# Patient Record
Sex: MALE | Race: BLACK OR AFRICAN AMERICAN | NOT HISPANIC OR LATINO | Employment: FULL TIME | URBAN - METROPOLITAN AREA
[De-identification: names, ages, dates, MRNs, and addresses within clinical notes are randomized per-mention and may not be internally consistent; named-entity substitution may affect disease eponyms.]

---

## 2017-05-24 ENCOUNTER — HISTORICAL (OUTPATIENT)
Dept: ADMINISTRATIVE | Facility: HOSPITAL | Age: 57
End: 2017-05-24

## 2017-05-24 LAB
ABS NEUT (OLG): 4.96 X10(3)/MCL (ref 2.1–9.2)
ALBUMIN SERPL-MCNC: 3.7 GM/DL (ref 3.4–5)
ALBUMIN/GLOB SERPL: 1.1 RATIO (ref 1.1–2)
ALP SERPL-CCNC: 83 UNIT/L (ref 50–136)
ALT SERPL-CCNC: 28 UNIT/L (ref 12–78)
AST SERPL-CCNC: 15 UNIT/L (ref 15–37)
BASOPHILS # BLD AUTO: 0 X10(3)/MCL (ref 0–0.2)
BASOPHILS NFR BLD AUTO: 0 %
BILIRUB SERPL-MCNC: 0.7 MG/DL (ref 0.2–1)
BILIRUBIN DIRECT+TOT PNL SERPL-MCNC: 0.1 MG/DL (ref 0–0.5)
BILIRUBIN DIRECT+TOT PNL SERPL-MCNC: 0.6 MG/DL (ref 0–0.8)
BUN SERPL-MCNC: 13 MG/DL (ref 7–18)
CALCIUM SERPL-MCNC: 8.9 MG/DL (ref 8.5–10.1)
CHLORIDE SERPL-SCNC: 108 MMOL/L (ref 98–107)
CHOLEST SERPL-MCNC: 211 MG/DL (ref 0–200)
CHOLEST/HDLC SERPL: 4.7 {RATIO} (ref 0–5)
CO2 SERPL-SCNC: 25 MMOL/L (ref 21–32)
CREAT SERPL-MCNC: 1.33 MG/DL (ref 0.7–1.3)
DEPRECATED CALCIDIOL+CALCIFEROL SERPL-MC: 44.76 NG/ML (ref 30–80)
EOSINOPHIL # BLD AUTO: 0.1 X10(3)/MCL (ref 0–0.9)
EOSINOPHIL NFR BLD AUTO: 1 %
ERYTHROCYTE [DISTWIDTH] IN BLOOD BY AUTOMATED COUNT: 13 % (ref 11.5–17)
ERYTHROCYTE [SEDIMENTATION RATE] IN BLOOD: 8 MM/HR (ref 0–15)
GLOBULIN SER-MCNC: 3.4 GM/DL (ref 2.4–3.5)
GLUCOSE SERPL-MCNC: 97 MG/DL (ref 74–106)
HCT VFR BLD AUTO: 40.8 % (ref 42–52)
HDLC SERPL-MCNC: 45 MG/DL (ref 35–60)
HGB BLD-MCNC: 13.7 GM/DL (ref 14–18)
LDLC SERPL CALC-MCNC: 95 MG/DL (ref 0–129)
LYMPHOCYTES # BLD AUTO: 1.4 X10(3)/MCL (ref 0.6–4.6)
LYMPHOCYTES NFR BLD AUTO: 20 %
MCH RBC QN AUTO: 26.9 PG (ref 27–31)
MCHC RBC AUTO-ENTMCNC: 33.6 GM/DL (ref 33–36)
MCV RBC AUTO: 80.2 FL (ref 80–94)
MONOCYTES # BLD AUTO: 0.6 X10(3)/MCL (ref 0.1–1.3)
MONOCYTES NFR BLD AUTO: 8 %
NEUTROPHILS # BLD AUTO: 4.96 X10(3)/MCL (ref 2.1–9.2)
NEUTROPHILS NFR BLD AUTO: 71 %
PLATELET # BLD AUTO: 243 X10(3)/MCL (ref 130–400)
PMV BLD AUTO: 9.4 FL (ref 9.4–12.4)
POTASSIUM SERPL-SCNC: 3.4 MMOL/L (ref 3.5–5.1)
PROT SERPL-MCNC: 7.1 GM/DL (ref 6.4–8.2)
RBC # BLD AUTO: 5.09 X10(6)/MCL (ref 4.7–6.1)
SODIUM SERPL-SCNC: 145 MMOL/L (ref 136–145)
TRIGL SERPL-MCNC: 357 MG/DL (ref 30–150)
VIT B12 SERPL-MCNC: 218 PG/ML (ref 193–986)
VLDLC SERPL CALC-MCNC: 71 MG/DL
WBC # SPEC AUTO: 7 X10(3)/MCL (ref 4.5–11.5)

## 2018-02-08 ENCOUNTER — HISTORICAL (OUTPATIENT)
Dept: RADIOLOGY | Facility: HOSPITAL | Age: 58
End: 2018-02-08

## 2018-03-01 ENCOUNTER — HISTORICAL (OUTPATIENT)
Dept: RADIOLOGY | Facility: HOSPITAL | Age: 58
End: 2018-03-01

## 2018-03-01 LAB — POC CREATININE: 1.5 MG/DL (ref 0.6–1.3)

## 2018-07-11 ENCOUNTER — HISTORICAL (OUTPATIENT)
Dept: ADMINISTRATIVE | Facility: HOSPITAL | Age: 58
End: 2018-07-11

## 2018-07-11 LAB
ABS NEUT (OLG): 1.9 X10(3)/MCL (ref 2.1–9.2)
ALBUMIN SERPL-MCNC: 3.4 GM/DL (ref 3.4–5)
ALBUMIN SERPL-MCNC: 3.4 GM/DL (ref 3.4–5)
ALBUMIN/GLOB SERPL: 1 {RATIO}
ALP SERPL-CCNC: 75 UNIT/L (ref 50–136)
ALP SERPL-CCNC: 75 UNIT/L (ref 50–136)
ALT SERPL-CCNC: 24 UNIT/L (ref 12–78)
ALT SERPL-CCNC: 24 UNIT/L (ref 12–78)
AST SERPL-CCNC: 15 UNIT/L (ref 15–37)
AST SERPL-CCNC: 17 UNIT/L (ref 15–37)
BASOPHILS # BLD AUTO: 0 X10(3)/MCL (ref 0–0.2)
BASOPHILS NFR BLD AUTO: 1 %
BILIRUB SERPL-MCNC: 0.6 MG/DL (ref 0.2–1)
BILIRUB SERPL-MCNC: 1.2 MG/DL (ref 0.2–1)
BILIRUBIN DIRECT+TOT PNL SERPL-MCNC: 0.1 MG/DL (ref 0–0.2)
BILIRUBIN DIRECT+TOT PNL SERPL-MCNC: 0.1 MG/DL (ref 0–0.5)
BILIRUBIN DIRECT+TOT PNL SERPL-MCNC: 0.5 MG/DL (ref 0–0.8)
BILIRUBIN DIRECT+TOT PNL SERPL-MCNC: 1.1 MG/DL (ref 0–0.8)
BUN SERPL-MCNC: 16 MG/DL (ref 7–18)
CALCIUM SERPL-MCNC: 8 MG/DL (ref 8.5–10.1)
CHLORIDE SERPL-SCNC: 108 MMOL/L (ref 98–107)
CHOLEST SERPL-MCNC: 188 MG/DL (ref 0–200)
CHOLEST/HDLC SERPL: 5.5 {RATIO} (ref 0–5)
CO2 SERPL-SCNC: 28 MMOL/L (ref 21–32)
CREAT SERPL-MCNC: 1.43 MG/DL (ref 0.7–1.3)
DEPRECATED CALCIDIOL+CALCIFEROL SERPL-MC: 38 NG/ML (ref 30–80)
EOSINOPHIL # BLD AUTO: 0.1 X10(3)/MCL (ref 0–0.9)
EOSINOPHIL NFR BLD AUTO: 2 %
ERYTHROCYTE [DISTWIDTH] IN BLOOD BY AUTOMATED COUNT: 13.3 % (ref 11.5–17)
ERYTHROCYTE [SEDIMENTATION RATE] IN BLOOD: 8 MM/HR (ref 0–15)
GLOBULIN SER-MCNC: 3.3 GM/DL (ref 2.4–3.5)
GLUCOSE SERPL-MCNC: 91 MG/DL (ref 74–106)
HCT VFR BLD AUTO: 36.8 % (ref 42–52)
HDLC SERPL-MCNC: 34 MG/DL (ref 35–60)
HGB BLD-MCNC: 12.3 GM/DL (ref 14–18)
LDLC SERPL CALC-MCNC: 94 MG/DL (ref 0–129)
LIVER PROFILE INTERP: ABNORMAL
LYMPHOCYTES # BLD AUTO: 2.2 X10(3)/MCL (ref 0.6–4.6)
LYMPHOCYTES NFR BLD AUTO: 45 %
MCH RBC QN AUTO: 27.2 PG (ref 27–31)
MCHC RBC AUTO-ENTMCNC: 33.4 GM/DL (ref 33–36)
MCV RBC AUTO: 81.4 FL (ref 80–94)
MONOCYTES # BLD AUTO: 0.6 X10(3)/MCL (ref 0.1–1.3)
MONOCYTES NFR BLD AUTO: 12 %
NEUTROPHILS # BLD AUTO: 1.9 X10(3)/MCL (ref 2.1–9.2)
NEUTROPHILS NFR BLD AUTO: 39 %
PLATELET # BLD AUTO: 210 X10(3)/MCL (ref 130–400)
PMV BLD AUTO: 9.7 FL (ref 9.4–12.4)
POTASSIUM SERPL-SCNC: 3.3 MMOL/L (ref 3.5–5.1)
PROT SERPL-MCNC: 6.7 GM/DL (ref 6.4–8.2)
PROT SERPL-MCNC: 6.7 GM/DL (ref 6.4–8.2)
RBC # BLD AUTO: 4.52 X10(6)/MCL (ref 4.7–6.1)
SODIUM SERPL-SCNC: 144 MMOL/L (ref 136–145)
TRIGL SERPL-MCNC: 302 MG/DL (ref 30–150)
VIT B12 SERPL-MCNC: 166 PG/ML (ref 193–986)
VLDLC SERPL CALC-MCNC: 60 MG/DL
WBC # SPEC AUTO: 4.9 X10(3)/MCL (ref 4.5–11.5)

## 2018-07-20 ENCOUNTER — HISTORICAL (OUTPATIENT)
Dept: RADIOLOGY | Facility: HOSPITAL | Age: 58
End: 2018-07-20

## 2018-11-24 LAB
INFLUENZA A ANTIGEN, POC: NEGATIVE
INFLUENZA B ANTIGEN, POC: NEGATIVE

## 2019-03-04 ENCOUNTER — HISTORICAL (OUTPATIENT)
Dept: LAB | Facility: HOSPITAL | Age: 59
End: 2019-03-04

## 2019-03-04 LAB
ABS NEUT (OLG): 2.66 X10(3)/MCL (ref 2.1–9.2)
ALBUMIN SERPL-MCNC: 3.5 GM/DL (ref 3.4–5)
ALBUMIN/GLOB SERPL: 1.1 RATIO (ref 1.1–2)
ALP SERPL-CCNC: 72 UNIT/L (ref 50–136)
ALT SERPL-CCNC: 27 UNIT/L (ref 12–78)
AST SERPL-CCNC: 11 UNIT/L (ref 15–37)
BASOPHILS # BLD AUTO: 0 X10(3)/MCL (ref 0–0.2)
BASOPHILS NFR BLD AUTO: 1 %
BILIRUB SERPL-MCNC: 0.5 MG/DL (ref 0.2–1)
BILIRUBIN DIRECT+TOT PNL SERPL-MCNC: 0.1 MG/DL (ref 0–0.5)
BILIRUBIN DIRECT+TOT PNL SERPL-MCNC: 0.4 MG/DL (ref 0–0.8)
BUN SERPL-MCNC: 12 MG/DL (ref 7–18)
CALCIUM SERPL-MCNC: 8.4 MG/DL (ref 8.5–10.1)
CHLORIDE SERPL-SCNC: 107 MMOL/L (ref 98–107)
CHOLEST SERPL-MCNC: 211 MG/DL (ref 0–200)
CHOLEST/HDLC SERPL: 3.8 {RATIO} (ref 0–5)
CO2 SERPL-SCNC: 30 MMOL/L (ref 21–32)
CREAT SERPL-MCNC: 1.26 MG/DL (ref 0.7–1.3)
DEPRECATED CALCIDIOL+CALCIFEROL SERPL-MC: 85.25 NG/ML (ref 30–80)
EOSINOPHIL # BLD AUTO: 0.1 X10(3)/MCL (ref 0–0.9)
EOSINOPHIL NFR BLD AUTO: 2 %
ERYTHROCYTE [DISTWIDTH] IN BLOOD BY AUTOMATED COUNT: 13.6 % (ref 11.5–17)
ERYTHROCYTE [SEDIMENTATION RATE] IN BLOOD: 18 MM/HR (ref 0–15)
GLOBULIN SER-MCNC: 3.3 GM/DL (ref 2.4–3.5)
GLUCOSE SERPL-MCNC: 102 MG/DL (ref 74–106)
HCT VFR BLD AUTO: 41.7 % (ref 42–52)
HDLC SERPL-MCNC: 55 MG/DL (ref 35–60)
HGB BLD-MCNC: 13.5 GM/DL (ref 14–18)
LDLC SERPL CALC-MCNC: 124 MG/DL (ref 0–129)
LYMPHOCYTES # BLD AUTO: 1.8 X10(3)/MCL (ref 0.6–4.6)
LYMPHOCYTES NFR BLD AUTO: 35 %
MCH RBC QN AUTO: 26.3 PG (ref 27–31)
MCHC RBC AUTO-ENTMCNC: 32.4 GM/DL (ref 33–36)
MCV RBC AUTO: 81.3 FL (ref 80–94)
MONOCYTES # BLD AUTO: 0.4 X10(3)/MCL (ref 0.1–1.3)
MONOCYTES NFR BLD AUTO: 9 %
NEUTROPHILS # BLD AUTO: 2.66 X10(3)/MCL (ref 2.1–9.2)
NEUTROPHILS NFR BLD AUTO: 52 %
PLATELET # BLD AUTO: 238 X10(3)/MCL (ref 130–400)
PMV BLD AUTO: 9.2 FL (ref 9.4–12.4)
POTASSIUM SERPL-SCNC: 3.8 MMOL/L (ref 3.5–5.1)
PROT SERPL-MCNC: 6.8 GM/DL (ref 6.4–8.2)
RBC # BLD AUTO: 5.13 X10(6)/MCL (ref 4.7–6.1)
SODIUM SERPL-SCNC: 143 MMOL/L (ref 136–145)
TRIGL SERPL-MCNC: 161 MG/DL (ref 30–150)
VIT B12 SERPL-MCNC: 767 PG/ML (ref 193–986)
VLDLC SERPL CALC-MCNC: 32 MG/DL
WBC # SPEC AUTO: 5.1 X10(3)/MCL (ref 4.5–11.5)

## 2019-05-15 LAB — RAPID GROUP A STREP (OHS): NEGATIVE

## 2019-05-29 ENCOUNTER — HISTORICAL (OUTPATIENT)
Dept: HEPATOLOGY | Facility: HOSPITAL | Age: 59
End: 2019-05-29

## 2019-05-29 LAB — POC CREATININE: 1.3 MG/DL (ref 0.6–1.3)

## 2019-06-16 ENCOUNTER — HISTORICAL (OUTPATIENT)
Dept: ADMINISTRATIVE | Facility: HOSPITAL | Age: 59
End: 2019-06-16

## 2019-06-26 ENCOUNTER — HISTORICAL (OUTPATIENT)
Dept: LAB | Facility: HOSPITAL | Age: 59
End: 2019-06-26

## 2019-06-26 LAB
ABS NEUT (OLG): 2.28 X10(3)/MCL (ref 2.1–9.2)
ALBUMIN SERPL-MCNC: 3.8 GM/DL (ref 3.4–5)
ALBUMIN/GLOB SERPL: 1.2 {RATIO}
ALP SERPL-CCNC: 81 UNIT/L (ref 50–136)
ALT SERPL-CCNC: 46 UNIT/L (ref 12–78)
AST SERPL-CCNC: 19 UNIT/L (ref 15–37)
BASOPHILS # BLD AUTO: 0 X10(3)/MCL (ref 0–0.2)
BASOPHILS NFR BLD AUTO: 1 %
BILIRUB SERPL-MCNC: 0.5 MG/DL (ref 0.2–1)
BILIRUBIN DIRECT+TOT PNL SERPL-MCNC: 0.1 MG/DL (ref 0–0.2)
BILIRUBIN DIRECT+TOT PNL SERPL-MCNC: 0.4 MG/DL (ref 0–0.8)
BUN SERPL-MCNC: 12 MG/DL (ref 7–18)
CALCIUM SERPL-MCNC: 8.5 MG/DL (ref 8.5–10.1)
CHLORIDE SERPL-SCNC: 106 MMOL/L (ref 98–107)
CO2 SERPL-SCNC: 27 MMOL/L (ref 21–32)
CREAT SERPL-MCNC: 1.31 MG/DL (ref 0.7–1.3)
CRP SERPL HS-MCNC: 1.09 MG/L (ref 0–3)
DEPRECATED CALCIDIOL+CALCIFEROL SERPL-MC: 97.8 NG/ML (ref 30–80)
EOSINOPHIL # BLD AUTO: 0 X10(3)/MCL (ref 0–0.9)
EOSINOPHIL NFR BLD AUTO: 1 %
ERYTHROCYTE [DISTWIDTH] IN BLOOD BY AUTOMATED COUNT: 12.9 % (ref 11.5–17)
ERYTHROCYTE [SEDIMENTATION RATE] IN BLOOD: 13 MM/HR (ref 0–15)
GLOBULIN SER-MCNC: 3.2 GM/DL (ref 2.4–3.5)
GLUCOSE SERPL-MCNC: 91 MG/DL (ref 74–106)
HCT VFR BLD AUTO: 39.2 % (ref 42–52)
HGB BLD-MCNC: 12.9 GM/DL (ref 14–18)
LYMPHOCYTES # BLD AUTO: 1.9 X10(3)/MCL (ref 0.6–4.6)
LYMPHOCYTES NFR BLD AUTO: 40 %
MCH RBC QN AUTO: 26.2 PG (ref 27–31)
MCHC RBC AUTO-ENTMCNC: 32.9 GM/DL (ref 33–36)
MCV RBC AUTO: 79.5 FL (ref 80–94)
MONOCYTES # BLD AUTO: 0.5 X10(3)/MCL (ref 0.1–1.3)
MONOCYTES NFR BLD AUTO: 10 %
NEUTROPHILS # BLD AUTO: 2.28 X10(3)/MCL (ref 2.1–9.2)
NEUTROPHILS NFR BLD AUTO: 48 %
PLATELET # BLD AUTO: 283 X10(3)/MCL (ref 130–400)
PMV BLD AUTO: 9.4 FL (ref 9.4–12.4)
POTASSIUM SERPL-SCNC: 3.6 MMOL/L (ref 3.5–5.1)
PROT SERPL-MCNC: 7 GM/DL (ref 6.4–8.2)
RBC # BLD AUTO: 4.93 X10(6)/MCL (ref 4.7–6.1)
SODIUM SERPL-SCNC: 141 MMOL/L (ref 136–145)
VIT B12 SERPL-MCNC: 1640 PG/ML (ref 193–986)
WBC # SPEC AUTO: 4.8 X10(3)/MCL (ref 4.5–11.5)

## 2019-07-30 ENCOUNTER — HISTORICAL (OUTPATIENT)
Dept: ADMINISTRATIVE | Facility: HOSPITAL | Age: 59
End: 2019-07-30

## 2019-09-27 ENCOUNTER — HISTORICAL (OUTPATIENT)
Dept: ADMINISTRATIVE | Facility: HOSPITAL | Age: 59
End: 2019-09-27

## 2019-11-07 ENCOUNTER — HISTORICAL (OUTPATIENT)
Dept: ADMINISTRATIVE | Facility: HOSPITAL | Age: 59
End: 2019-11-07

## 2019-11-07 LAB
ABS NEUT (OLG): 2.27 X10(3)/MCL (ref 2.1–9.2)
ALBUMIN SERPL-MCNC: 4.1 GM/DL (ref 3.4–5)
ALBUMIN/GLOB SERPL: 1.4 RATIO (ref 1.1–2)
ALP SERPL-CCNC: 91 UNIT/L (ref 50–136)
ALT SERPL-CCNC: 54 UNIT/L (ref 12–78)
AST SERPL-CCNC: 28 UNIT/L (ref 15–37)
BASOPHILS # BLD AUTO: 0 X10(3)/MCL (ref 0–0.2)
BASOPHILS NFR BLD AUTO: 1 %
BILIRUB SERPL-MCNC: 0.9 MG/DL (ref 0.2–1)
BILIRUBIN DIRECT+TOT PNL SERPL-MCNC: 0.2 MG/DL (ref 0–0.5)
BILIRUBIN DIRECT+TOT PNL SERPL-MCNC: 0.7 MG/DL (ref 0–0.8)
BNP BLD-MCNC: 24 PG/ML (ref 0–125)
BUN SERPL-MCNC: 11 MG/DL (ref 7–18)
CALCIUM SERPL-MCNC: 8.8 MG/DL (ref 8.5–10.1)
CHLORIDE SERPL-SCNC: 105 MMOL/L (ref 98–107)
CHOLEST SERPL-MCNC: 124 MG/DL (ref 0–200)
CHOLEST/HDLC SERPL: 3.2 {RATIO} (ref 0–5)
CO2 SERPL-SCNC: 30 MMOL/L (ref 21–32)
CREAT SERPL-MCNC: 1.37 MG/DL (ref 0.7–1.3)
CRP SERPL HS-MCNC: 0.67 MG/L (ref 0–3)
DEPRECATED CALCIDIOL+CALCIFEROL SERPL-MC: 119.51 NG/ML (ref 30–80)
EOSINOPHIL # BLD AUTO: 0.1 X10(3)/MCL (ref 0–0.9)
EOSINOPHIL NFR BLD AUTO: 1 %
ERYTHROCYTE [DISTWIDTH] IN BLOOD BY AUTOMATED COUNT: 12.9 % (ref 11.5–17)
ERYTHROCYTE [SEDIMENTATION RATE] IN BLOOD: 8 MM/HR (ref 0–15)
GLOBULIN SER-MCNC: 3 GM/DL (ref 2.4–3.5)
GLUCOSE SERPL-MCNC: 98 MG/DL (ref 74–106)
HCT VFR BLD AUTO: 41.7 % (ref 42–52)
HDLC SERPL-MCNC: 39 MG/DL (ref 35–60)
HGB BLD-MCNC: 13.7 GM/DL (ref 14–18)
LDLC SERPL CALC-MCNC: 58 MG/DL (ref 0–129)
LYMPHOCYTES # BLD AUTO: 1.9 X10(3)/MCL (ref 0.6–4.6)
LYMPHOCYTES NFR BLD AUTO: 38 %
MCH RBC QN AUTO: 26.7 PG (ref 27–31)
MCHC RBC AUTO-ENTMCNC: 32.9 GM/DL (ref 33–36)
MCV RBC AUTO: 81.1 FL (ref 80–94)
MONOCYTES # BLD AUTO: 0.6 X10(3)/MCL (ref 0.1–1.3)
MONOCYTES NFR BLD AUTO: 12 %
NEUTROPHILS # BLD AUTO: 2.27 X10(3)/MCL (ref 2.1–9.2)
NEUTROPHILS NFR BLD AUTO: 47 %
PLATELET # BLD AUTO: 288 X10(3)/MCL (ref 130–400)
PMV BLD AUTO: 9.1 FL (ref 9.4–12.4)
POTASSIUM SERPL-SCNC: 3.6 MMOL/L (ref 3.5–5.1)
PROT SERPL-MCNC: 7.1 GM/DL (ref 6.4–8.2)
RBC # BLD AUTO: 5.14 X10(6)/MCL (ref 4.7–6.1)
SODIUM SERPL-SCNC: 141 MMOL/L (ref 136–145)
TRIGL SERPL-MCNC: 137 MG/DL (ref 30–150)
VIT B12 SERPL-MCNC: 896 PG/ML (ref 193–986)
VLDLC SERPL CALC-MCNC: 27 MG/DL
WBC # SPEC AUTO: 4.9 X10(3)/MCL (ref 4.5–11.5)

## 2019-12-16 ENCOUNTER — HISTORICAL (OUTPATIENT)
Dept: LAB | Facility: HOSPITAL | Age: 59
End: 2019-12-16

## 2019-12-30 LAB
INFLUENZA A ANTIGEN, POC: NEGATIVE
INFLUENZA B ANTIGEN, POC: NEGATIVE
RAPID GROUP A STREP (OHS): NEGATIVE

## 2020-03-04 ENCOUNTER — HISTORICAL (OUTPATIENT)
Dept: ADMINISTRATIVE | Facility: HOSPITAL | Age: 60
End: 2020-03-04

## 2020-09-10 ENCOUNTER — HISTORICAL (OUTPATIENT)
Dept: ADMINISTRATIVE | Facility: HOSPITAL | Age: 60
End: 2020-09-10

## 2020-09-10 LAB
ABS NEUT (OLG): 1.98 X10(3)/MCL (ref 2.1–9.2)
ALBUMIN SERPL-MCNC: 3.9 GM/DL (ref 3.5–5)
ALBUMIN/GLOB SERPL: 1.5 RATIO (ref 1.1–2)
ALP SERPL-CCNC: 88 UNIT/L (ref 40–150)
ALT SERPL-CCNC: 64 UNIT/L (ref 0–55)
AST SERPL-CCNC: 34 UNIT/L (ref 5–34)
BASOPHILS # BLD AUTO: 0 X10(3)/MCL (ref 0–0.2)
BASOPHILS NFR BLD AUTO: 1 %
BILIRUB SERPL-MCNC: 0.6 MG/DL
BILIRUBIN DIRECT+TOT PNL SERPL-MCNC: 0.3 MG/DL (ref 0–0.5)
BILIRUBIN DIRECT+TOT PNL SERPL-MCNC: 0.3 MG/DL (ref 0–0.8)
BUN SERPL-MCNC: 13 MG/DL (ref 8.4–25.7)
CALCIUM SERPL-MCNC: 8.6 MG/DL (ref 8.4–10.2)
CHLORIDE SERPL-SCNC: 107 MMOL/L (ref 98–107)
CO2 SERPL-SCNC: 27 MMOL/L (ref 22–29)
CREAT SERPL-MCNC: 1.31 MG/DL (ref 0.73–1.18)
EOSINOPHIL # BLD AUTO: 0.1 X10(3)/MCL (ref 0–0.9)
EOSINOPHIL NFR BLD AUTO: 2 %
ERYTHROCYTE [DISTWIDTH] IN BLOOD BY AUTOMATED COUNT: 13.8 % (ref 11.5–17)
GLOBULIN SER-MCNC: 2.6 GM/DL (ref 2.4–3.5)
GLUCOSE SERPL-MCNC: 106 MG/DL (ref 74–100)
HCT VFR BLD AUTO: 40 % (ref 42–52)
HGB BLD-MCNC: 13.2 GM/DL (ref 14–18)
INR PPP: 1 (ref 0–1.3)
LYMPHOCYTES # BLD AUTO: 2 X10(3)/MCL (ref 0.6–4.6)
LYMPHOCYTES NFR BLD AUTO: 41 %
MCH RBC QN AUTO: 26.3 PG (ref 27–31)
MCHC RBC AUTO-ENTMCNC: 33 GM/DL (ref 33–36)
MCV RBC AUTO: 79.7 FL (ref 80–94)
MONOCYTES # BLD AUTO: 0.7 X10(3)/MCL (ref 0.1–1.3)
MONOCYTES NFR BLD AUTO: 14 %
NEUTROPHILS # BLD AUTO: 1.98 X10(3)/MCL (ref 2.1–9.2)
NEUTROPHILS NFR BLD AUTO: 42 %
PLATELET # BLD AUTO: 217 X10(3)/MCL (ref 130–400)
PMV BLD AUTO: 9.7 FL (ref 9.4–12.4)
POTASSIUM SERPL-SCNC: 3.7 MMOL/L (ref 3.5–5.1)
PROT SERPL-MCNC: 6.5 GM/DL (ref 6.4–8.3)
PROTHROMBIN TIME: 12.2 SECOND(S) (ref 11.1–13.7)
RBC # BLD AUTO: 5.02 X10(6)/MCL (ref 4.7–6.1)
SODIUM SERPL-SCNC: 144 MMOL/L (ref 136–145)
WBC # SPEC AUTO: 4.8 X10(3)/MCL (ref 4.5–11.5)

## 2020-11-17 ENCOUNTER — HISTORICAL (OUTPATIENT)
Dept: ADMINISTRATIVE | Facility: HOSPITAL | Age: 60
End: 2020-11-17

## 2020-11-17 LAB
ABS NEUT (OLG): 2.54 X10(3)/MCL (ref 2.1–9.2)
ALBUMIN SERPL-MCNC: 4 GM/DL (ref 3.5–5)
ALBUMIN/GLOB SERPL: 1.5 RATIO (ref 1.1–2)
ALP SERPL-CCNC: 86 UNIT/L (ref 40–150)
ALT SERPL-CCNC: 51 UNIT/L (ref 0–55)
AST SERPL-CCNC: 36 UNIT/L (ref 5–34)
BASOPHILS # BLD AUTO: 0.1 X10(3)/MCL (ref 0–0.2)
BASOPHILS NFR BLD AUTO: 1 %
BILIRUB SERPL-MCNC: 0.7 MG/DL
BILIRUBIN DIRECT+TOT PNL SERPL-MCNC: 0.2 MG/DL (ref 0–0.5)
BILIRUBIN DIRECT+TOT PNL SERPL-MCNC: 0.5 MG/DL (ref 0–0.8)
BUN SERPL-MCNC: 15.2 MG/DL (ref 8.4–25.7)
CALCIUM SERPL-MCNC: 8.7 MG/DL (ref 8.4–10.2)
CHLORIDE SERPL-SCNC: 106 MMOL/L (ref 98–107)
CHOLEST SERPL-MCNC: 129 MG/DL
CHOLEST/HDLC SERPL: 4 {RATIO} (ref 0–5)
CO2 SERPL-SCNC: 28 MMOL/L (ref 22–29)
CREAT SERPL-MCNC: 1.36 MG/DL (ref 0.73–1.18)
DEPRECATED CALCIDIOL+CALCIFEROL SERPL-MC: 36.4 NG/ML (ref 30–80)
EOSINOPHIL # BLD AUTO: 0.1 X10(3)/MCL (ref 0–0.9)
EOSINOPHIL NFR BLD AUTO: 1 %
ERYTHROCYTE [DISTWIDTH] IN BLOOD BY AUTOMATED COUNT: 13.5 % (ref 11.5–17)
ERYTHROCYTE [SEDIMENTATION RATE] IN BLOOD: 14 MM/HR (ref 0–15)
GLOBULIN SER-MCNC: 2.7 GM/DL (ref 2.4–3.5)
GLUCOSE SERPL-MCNC: 90 MG/DL (ref 74–100)
HCT VFR BLD AUTO: 38.2 % (ref 42–52)
HDLC SERPL-MCNC: 32 MG/DL (ref 35–60)
HGB BLD-MCNC: 12.6 GM/DL (ref 14–18)
LDLC SERPL CALC-MCNC: 63 MG/DL (ref 50–140)
LYMPHOCYTES # BLD AUTO: 2.2 X10(3)/MCL (ref 0.6–4.6)
LYMPHOCYTES NFR BLD AUTO: 40 %
MCH RBC QN AUTO: 26 PG (ref 27–31)
MCHC RBC AUTO-ENTMCNC: 33 GM/DL (ref 33–36)
MCV RBC AUTO: 78.8 FL (ref 80–94)
MONOCYTES # BLD AUTO: 0.7 X10(3)/MCL (ref 0.1–1.3)
MONOCYTES NFR BLD AUTO: 12 %
NEUTROPHILS # BLD AUTO: 2.54 X10(3)/MCL (ref 2.1–9.2)
NEUTROPHILS NFR BLD AUTO: 45 %
PLATELET # BLD AUTO: 273 X10(3)/MCL (ref 130–400)
PMV BLD AUTO: 9.4 FL (ref 9.4–12.4)
POTASSIUM SERPL-SCNC: 3.7 MMOL/L (ref 3.5–5.1)
PROT SERPL-MCNC: 6.7 GM/DL (ref 6.4–8.3)
RBC # BLD AUTO: 4.85 X10(6)/MCL (ref 4.7–6.1)
SODIUM SERPL-SCNC: 145 MMOL/L (ref 136–145)
TRIGL SERPL-MCNC: 168 MG/DL (ref 34–140)
VIT B12 SERPL-MCNC: 749 PG/ML (ref 213–816)
VLDLC SERPL CALC-MCNC: 34 MG/DL
WBC # SPEC AUTO: 5.6 X10(3)/MCL (ref 4.5–11.5)

## 2020-12-29 ENCOUNTER — HISTORICAL (OUTPATIENT)
Dept: RADIOLOGY | Facility: HOSPITAL | Age: 60
End: 2020-12-29

## 2021-08-02 ENCOUNTER — HISTORICAL (OUTPATIENT)
Dept: ADMINISTRATIVE | Facility: HOSPITAL | Age: 61
End: 2021-08-02

## 2021-08-23 ENCOUNTER — HISTORICAL (OUTPATIENT)
Dept: RADIOLOGY | Facility: HOSPITAL | Age: 61
End: 2021-08-23

## 2021-08-27 ENCOUNTER — HISTORICAL (OUTPATIENT)
Dept: RADIOLOGY | Facility: HOSPITAL | Age: 61
End: 2021-08-27

## 2021-08-27 ENCOUNTER — HISTORICAL (OUTPATIENT)
Dept: ADMINISTRATIVE | Facility: HOSPITAL | Age: 61
End: 2021-08-27

## 2021-08-27 LAB
ABS NEUT (OLG): 1.79 X10(3)/MCL (ref 2.1–9.2)
ALBUMIN SERPL-MCNC: 4.2 GM/DL (ref 3.4–4.8)
ALBUMIN/GLOB SERPL: 1.7 RATIO (ref 1.1–2)
ALP SERPL-CCNC: 84 UNIT/L (ref 40–150)
ALT SERPL-CCNC: 27 UNIT/L (ref 0–55)
AST SERPL-CCNC: 19 UNIT/L (ref 5–34)
BASOPHILS # BLD AUTO: 0 X10(3)/MCL (ref 0–0.2)
BASOPHILS NFR BLD AUTO: 1 %
BILIRUB SERPL-MCNC: 1 MG/DL
BILIRUBIN DIRECT+TOT PNL SERPL-MCNC: 0.4 MG/DL (ref 0–0.5)
BILIRUBIN DIRECT+TOT PNL SERPL-MCNC: 0.6 MG/DL (ref 0–0.8)
BUN SERPL-MCNC: 10.3 MG/DL (ref 8.4–25.7)
CALCIUM SERPL-MCNC: 8.9 MG/DL (ref 8.8–10)
CHLORIDE SERPL-SCNC: 105 MMOL/L (ref 98–107)
CHOLEST SERPL-MCNC: 115 MG/DL
CHOLEST/HDLC SERPL: 3 {RATIO} (ref 0–5)
CO2 SERPL-SCNC: 27 MMOL/L (ref 23–31)
CREAT SERPL-MCNC: 1.25 MG/DL (ref 0.73–1.18)
CRP SERPL-MCNC: <0.1 MG/DL
DEPRECATED CALCIDIOL+CALCIFEROL SERPL-MC: 41.3 NG/ML (ref 30–80)
EOSINOPHIL # BLD AUTO: 0.1 X10(3)/MCL (ref 0–0.9)
EOSINOPHIL NFR BLD AUTO: 3 %
ERYTHROCYTE [DISTWIDTH] IN BLOOD BY AUTOMATED COUNT: 13.2 % (ref 11.5–17)
ERYTHROCYTE [SEDIMENTATION RATE] IN BLOOD: 6 MM/HR (ref 0–15)
GLOBULIN SER-MCNC: 2.5 GM/DL (ref 2.4–3.5)
GLUCOSE SERPL-MCNC: 98 MG/DL (ref 82–115)
HCT VFR BLD AUTO: 38.1 % (ref 42–52)
HDLC SERPL-MCNC: 36 MG/DL (ref 35–60)
HGB BLD-MCNC: 12.5 GM/DL (ref 14–18)
LDLC SERPL CALC-MCNC: 57 MG/DL (ref 50–140)
LYMPHOCYTES # BLD AUTO: 1.9 X10(3)/MCL (ref 0.6–4.6)
LYMPHOCYTES NFR BLD AUTO: 44 %
MCH RBC QN AUTO: 26 PG (ref 27–31)
MCHC RBC AUTO-ENTMCNC: 32.8 GM/DL (ref 33–36)
MCV RBC AUTO: 79.4 FL (ref 80–94)
MONOCYTES # BLD AUTO: 0.4 X10(3)/MCL (ref 0.1–1.3)
MONOCYTES NFR BLD AUTO: 10 %
NEUTROPHILS # BLD AUTO: 1.79 X10(3)/MCL (ref 2.1–9.2)
NEUTROPHILS NFR BLD AUTO: 42 %
PLATELET # BLD AUTO: 244 X10(3)/MCL (ref 130–400)
PMV BLD AUTO: 9.8 FL (ref 9.4–12.4)
POTASSIUM SERPL-SCNC: 3.9 MMOL/L (ref 3.5–5.1)
PROT SERPL-MCNC: 6.7 GM/DL (ref 5.8–7.6)
RBC # BLD AUTO: 4.8 X10(6)/MCL (ref 4.7–6.1)
SODIUM SERPL-SCNC: 142 MMOL/L (ref 136–145)
TRIGL SERPL-MCNC: 108 MG/DL (ref 34–140)
VIT B12 SERPL-MCNC: 1196 PG/ML (ref 213–816)
VLDLC SERPL CALC-MCNC: 22 MG/DL
WBC # SPEC AUTO: 4.2 X10(3)/MCL (ref 4.5–11.5)

## 2022-03-17 ENCOUNTER — HISTORICAL (OUTPATIENT)
Dept: ADMINISTRATIVE | Facility: HOSPITAL | Age: 62
End: 2022-03-17

## 2022-03-17 LAB
ABS NEUT (OLG): 2.43 (ref 2.1–9.2)
ALBUMIN SERPL-MCNC: 4 G/DL (ref 3.4–4.8)
ALBUMIN/GLOB SERPL: 1.2 {RATIO} (ref 1.1–2)
ALP SERPL-CCNC: 89 U/L (ref 40–150)
ALT SERPL-CCNC: 40 U/L (ref 0–55)
AST SERPL-CCNC: 22 U/L (ref 5–34)
BASOPHILS # BLD AUTO: 0 10*3/UL (ref 0–0.2)
BASOPHILS NFR BLD AUTO: 1 %
BILIRUB SERPL-MCNC: 1.2 MG/DL
BILIRUBIN DIRECT+TOT PNL SERPL-MCNC: 0.5 (ref 0–0.5)
BILIRUBIN DIRECT+TOT PNL SERPL-MCNC: 0.7 (ref 0–0.8)
BUN SERPL-MCNC: 13.2 MG/DL (ref 8.4–25.7)
CALCIUM SERPL-MCNC: 9 MG/DL (ref 8.7–10.5)
CHLORIDE SERPL-SCNC: 102 MMOL/L (ref 98–107)
CHOLEST SERPL-MCNC: 134 MG/DL
CHOLEST/HDLC SERPL: 3 {RATIO} (ref 0–5)
CO2 SERPL-SCNC: 32 MMOL/L (ref 23–31)
CREAT SERPL-MCNC: 1.47 MG/DL (ref 0.73–1.18)
CRP SERPL HS-MCNC: 0.05 MG/L
DEPRECATED CALCIDIOL+CALCIFEROL SERPL-MC: 32.7 NG/ML (ref 30–80)
EOSINOPHIL # BLD AUTO: 0.1 10*3/UL (ref 0–0.9)
EOSINOPHIL NFR BLD AUTO: 2 %
ERYTHROCYTE [DISTWIDTH] IN BLOOD BY AUTOMATED COUNT: 13.6 % (ref 11.5–17)
ERYTHROCYTE [SEDIMENTATION RATE] IN BLOOD: 7 MM/H (ref 0–15)
GLOBULIN SER-MCNC: 3.2 G/DL (ref 2.4–3.5)
GLUCOSE SERPL-MCNC: 103 MG/DL (ref 82–115)
HCT VFR BLD AUTO: 42.5 % (ref 42–52)
HDLC SERPL-MCNC: 44 MG/DL (ref 35–60)
HEMOLYSIS INTERF INDEX SERPL-ACNC: 2
HGB BLD-MCNC: 13.9 G/DL (ref 14–18)
ICTERIC INTERF INDEX SERPL-ACNC: 1
LDLC SERPL CALC-MCNC: 67 MG/DL (ref 50–140)
LIPEMIC INTERF INDEX SERPL-ACNC: 1
LYMPHOCYTES # BLD AUTO: 2 10*3/UL (ref 0.6–4.6)
LYMPHOCYTES NFR BLD AUTO: 39 %
MANUAL DIFF? (OHS): NO
MCH RBC QN AUTO: 26.1 PG (ref 27–31)
MCHC RBC AUTO-ENTMCNC: 32.7 G/DL (ref 33–36)
MCV RBC AUTO: 79.7 FL (ref 80–94)
MONOCYTES # BLD AUTO: 0.6 10*3/UL (ref 0.1–1.3)
MONOCYTES NFR BLD AUTO: 11 %
NEUTROPHILS # BLD AUTO: 2.43 10*3/UL (ref 2.1–9.2)
NEUTROPHILS NFR BLD AUTO: 47 %
PLATELET # BLD AUTO: 230 10*3/UL (ref 130–400)
PMV BLD AUTO: 9.7 FL (ref 9.4–12.4)
POTASSIUM SERPL-SCNC: 3.8 MMOL/L (ref 3.5–5.1)
PROT SERPL-MCNC: 7.2 G/DL (ref 5.8–7.6)
RBC # BLD AUTO: 5.33 10*6/UL (ref 4.7–6.1)
SODIUM SERPL-SCNC: 142 MMOL/L (ref 136–145)
TRIGL SERPL-MCNC: 113 MG/DL (ref 34–140)
VIT B12 SERPL-MCNC: 748 PG/ML (ref 213–816)
VLDLC SERPL CALC-MCNC: 23 MG/DL
WBC # SPEC AUTO: 5.2 10*3/UL (ref 4.5–11.5)

## 2022-04-09 ENCOUNTER — HISTORICAL (OUTPATIENT)
Dept: ADMINISTRATIVE | Facility: HOSPITAL | Age: 62
End: 2022-04-09
Payer: COMMERCIAL

## 2022-04-27 VITALS
WEIGHT: 204.13 LBS | SYSTOLIC BLOOD PRESSURE: 111 MMHG | DIASTOLIC BLOOD PRESSURE: 65 MMHG | OXYGEN SATURATION: 97 % | HEIGHT: 69 IN | BODY MASS INDEX: 30.23 KG/M2

## 2022-05-09 DIAGNOSIS — M17.11 PRIMARY OSTEOARTHRITIS OF RIGHT KNEE: Primary | ICD-10-CM

## 2022-05-17 DIAGNOSIS — M17.0 PRIMARY OSTEOARTHRITIS OF BOTH KNEES: Primary | ICD-10-CM

## 2022-05-17 RX ORDER — HYALURONATE SODIUM, STABILIZED 60 MG/3 ML
60 SYRINGE (ML) INTRAARTICULAR SEE ADMIN INSTRUCTIONS
Qty: 2 EACH | Refills: 0 | Status: CANCELLED | OUTPATIENT
Start: 2022-05-24

## 2022-05-20 ENCOUNTER — OFFICE VISIT (OUTPATIENT)
Dept: ORTHOPEDICS | Facility: CLINIC | Age: 62
End: 2022-05-20
Payer: COMMERCIAL

## 2022-05-20 VITALS — HEIGHT: 68 IN | BODY MASS INDEX: 30.94 KG/M2 | WEIGHT: 204.13 LBS

## 2022-05-20 DIAGNOSIS — M79.645 PAIN OF LEFT THUMB: ICD-10-CM

## 2022-05-20 DIAGNOSIS — M17.11 PRIMARY OSTEOARTHRITIS OF RIGHT KNEE: Primary | ICD-10-CM

## 2022-05-20 PROCEDURE — 3008F BODY MASS INDEX DOCD: CPT | Mod: CPTII,,, | Performed by: PHYSICIAN ASSISTANT

## 2022-05-20 PROCEDURE — 1160F RVW MEDS BY RX/DR IN RCRD: CPT | Mod: CPTII,,, | Performed by: PHYSICIAN ASSISTANT

## 2022-05-20 PROCEDURE — 20610 LARGE JOINT ASPIRATION/INJECTION: R KNEE: ICD-10-PCS | Mod: RT,,, | Performed by: PHYSICIAN ASSISTANT

## 2022-05-20 PROCEDURE — 3008F PR BODY MASS INDEX (BMI) DOCUMENTED: ICD-10-PCS | Mod: CPTII,,, | Performed by: PHYSICIAN ASSISTANT

## 2022-05-20 PROCEDURE — 99499 UNLISTED E&M SERVICE: CPT | Mod: ,,, | Performed by: PHYSICIAN ASSISTANT

## 2022-05-20 PROCEDURE — 20610 DRAIN/INJ JOINT/BURSA W/O US: CPT | Mod: RT,,, | Performed by: PHYSICIAN ASSISTANT

## 2022-05-20 PROCEDURE — 1159F MED LIST DOCD IN RCRD: CPT | Mod: CPTII,,, | Performed by: PHYSICIAN ASSISTANT

## 2022-05-20 PROCEDURE — 1160F PR REVIEW ALL MEDS BY PRESCRIBER/CLIN PHARMACIST DOCUMENTED: ICD-10-PCS | Mod: CPTII,,, | Performed by: PHYSICIAN ASSISTANT

## 2022-05-20 PROCEDURE — 99499 NO LOS: ICD-10-PCS | Mod: ,,, | Performed by: PHYSICIAN ASSISTANT

## 2022-05-20 PROCEDURE — 1159F PR MEDICATION LIST DOCUMENTED IN MEDICAL RECORD: ICD-10-PCS | Mod: CPTII,,, | Performed by: PHYSICIAN ASSISTANT

## 2022-05-20 RX ORDER — ROSUVASTATIN CALCIUM 20 MG/1
20 TABLET, COATED ORAL DAILY
COMMUNITY
Start: 2022-03-31

## 2022-05-20 RX ORDER — RANOLAZINE 500 MG/1
500 TABLET, EXTENDED RELEASE ORAL
COMMUNITY

## 2022-05-20 RX ORDER — CARVEDILOL 25 MG/1
25 TABLET ORAL 2 TIMES DAILY
COMMUNITY
Start: 2022-04-18

## 2022-05-20 RX ORDER — PANTOPRAZOLE SODIUM 40 MG/1
40 TABLET, DELAYED RELEASE ORAL DAILY
COMMUNITY
Start: 2022-04-22

## 2022-05-20 RX ORDER — AMLODIPINE BESYLATE 10 MG/1
10 TABLET ORAL DAILY
COMMUNITY
Start: 2022-02-26

## 2022-05-20 RX ORDER — LIDOCAINE HYDROCHLORIDE 20 MG/ML
2 INJECTION, SOLUTION INFILTRATION; PERINEURAL
Status: DISCONTINUED | OUTPATIENT
Start: 2022-05-20 | End: 2022-05-20 | Stop reason: HOSPADM

## 2022-05-20 RX ORDER — CYANOCOBALAMIN 1000 UG/ML
INJECTION, SOLUTION INTRAMUSCULAR; SUBCUTANEOUS
COMMUNITY
Start: 2022-02-26

## 2022-05-20 RX ORDER — POTASSIUM CHLORIDE 750 MG/1
TABLET, FILM COATED, EXTENDED RELEASE ORAL
COMMUNITY
Start: 2022-05-14

## 2022-05-20 RX ORDER — FUROSEMIDE 20 MG/1
TABLET ORAL
COMMUNITY
Start: 2022-03-22

## 2022-05-20 RX ADMIN — LIDOCAINE HYDROCHLORIDE 2 ML: 20 INJECTION, SOLUTION INFILTRATION; PERINEURAL at 08:05

## 2022-05-20 NOTE — PROCEDURES
Large Joint Aspiration/Injection: R knee    Date/Time: 5/20/2022 8:30 AM  Performed by: Reg Smith PA-C  Authorized by: Reg Smith PA-C     Consent Done?:  Yes (Verbal)  Indications:  Pain  Site marked: the procedure site was marked    Timeout: prior to procedure the correct patient, procedure, and site was verified    Prep: patient was prepped and draped in usual sterile fashion      Local anesthesia used?: Yes    Local anesthetic:  Topical anesthetic and lidocaine 2% without epinephrine  Ultrasonic Guidance for needle placement?: No    Approach:  Superior  Location:  Knee  Site:  R knee  Medications:  2 mL LIDOcaine HCL 20 mg/ml (2%) 20 mg/mL (2 %); 48 mg hylan g-f 20 48 mg/6 mL  Patient tolerance:  Patient tolerated the procedure well with no immediate complications

## 2022-05-20 NOTE — PROGRESS NOTES
"Chief Complaint:   Chief Complaint   Patient presents with    Right Knee - Pain    Knee Pain     pt here for synvisc one inj       History of present illness:    This is a 62 y.o. year old male who complains of     Review of Systems:    Constitution:   Denies chills, fever, and sweats.  HENT:   Denies headaches or blurry vision.  Cardiovascular:  Denies chest pain or irregular heart beat.  Respiratory:   Denies cough or shortness of breath.  Gastrointestinal:  Denies abdominal pain, nausea, or vomiting.  Musculoskeletal:   Denies muscle cramps.  Neurological:   Denies dizziness or focal weakness.  Psychiatric/Behavior: Normal mental status.  Hematology/Lymph:  Denies bleeding problem or easy bruising/bleeding.  Skin:    Denies rash or suspicious lesions.    Examination:    Vital Signs:    Vitals:    05/20/22 0816   Weight: 92.6 kg (204 lb 2.3 oz)   Height: 5' 8" (1.727 m)       Body mass index is 31.04 kg/m².    Constitution:   Well-developed, well nourished patient in no acute distress.  Neurological:   Alert and oriented x 3 and cooperative to examination.     Psychiatric/Behavior: Normal mental status.  Respiratory:   No shortness of breath.  Eyes:    Extraoccular muscles intact  Skin:    No scars, rash or suspicious lesions.    Physical Exam:     Imaging: X-rays ordered and images interpreted today personally by me of         Assessment: Right shoulder pain, unspecified chronicity        Plan:            DISCLAIMER: This note may have been dictated using voice recognition software and may contain grammatical errors.     NOTE: Consult report sent to referring provider via [facility] EMR.  "

## 2022-05-20 NOTE — PROGRESS NOTES
"Chief Complaint:   Chief Complaint   Patient presents with    Right Knee - Pain    Knee Pain     pt here for synvisc one inj       History of present illness:    This is a 62 y.o. year old male who complains of right kene pain  Wants to have Synvisc one right knee    Review of Systems:    Constitution:   Denies chills, fever, and sweats.  HENT:   Denies headaches or blurry vision.  Cardiovascular:  Denies chest pain or irregular heart beat.  Respiratory:   Denies cough or shortness of breath.  Gastrointestinal:  Denies abdominal pain, nausea, or vomiting.  Musculoskeletal:   Denies muscle cramps.  Neurological:   Denies dizziness or focal weakness.  Psychiatric/Behavior: Normal mental status.  Hematology/Lymph:  Denies bleeding problem or easy bruising/bleeding.  Skin:    Denies rash or suspicious lesions.    Examination:    Vital Signs:    Vitals:    22 0816   Weight: 92.6 kg (204 lb 2.3 oz)   Height: 5' 8" (1.727 m)       Body mass index is 31.04 kg/m².    Constitution:   Well-developed, well nourished patient in no acute distress.  Neurological:   Alert and oriented x 3 and cooperative to examination.     Psychiatric/Behavior: Normal mental status.  Respiratory:   No shortness of breath.  Eyes:    Extraoccular muscles intact  Skin:    No scars, rash or suspicious lesions.    Physical Exam:       General Musculoskeletal Exam   Gait: antalgic       right Knee Exam     Inspection   Erythema: absent  Effusion: present  Deformity: present  Bruising: absent    Tenderness   The patient is tender to palpation of the medial joint line    Crepitus   The patient has crepitus with ROM    Range of Motion   Extension: abnormal   Flexion: abnormal   3-120      Comments:  varus deformity    Muscle Strength   Right Lower Extremity   Quadriceps:  5/5   Hamstrin/5     Vascular Exam     Right Pulses  Dorsalis Pedis:      2+  Posterior Tibial:      2+       Assessment: Right shoulder pain, unspecified " chronicity        Plan:  Synvisc One right knee knee today          DISCLAIMER: This note may have been dictated using voice recognition software and may contain grammatical errors.     NOTE: Consult report sent to referring provider via [employer] EMR.

## 2022-06-09 RX ORDER — TIZANIDINE 4 MG/1
8 TABLET ORAL NIGHTLY
COMMUNITY
Start: 2022-04-23

## 2022-06-09 RX ORDER — CIPROFLOXACIN 500 MG/1
500 TABLET ORAL 2 TIMES DAILY
COMMUNITY
Start: 2022-01-27 | End: 2023-02-01

## 2022-06-09 RX ORDER — CETIRIZINE HYDROCHLORIDE 10 MG/1
10 TABLET ORAL DAILY
COMMUNITY
Start: 2021-12-22 | End: 2023-02-01

## 2022-06-09 RX ORDER — FLUTICASONE PROPIONATE 50 MCG
SPRAY, SUSPENSION (ML) NASAL
COMMUNITY
Start: 2021-12-22

## 2022-06-09 RX ORDER — IBUPROFEN 800 MG/1
800 TABLET ORAL 2 TIMES DAILY PRN
COMMUNITY
Start: 2022-04-13 | End: 2023-02-01

## 2022-06-09 RX ORDER — TRAMADOL HYDROCHLORIDE 50 MG/1
50 TABLET ORAL EVERY 6 HOURS PRN
COMMUNITY
Start: 2022-03-23 | End: 2023-02-01

## 2022-06-09 RX ORDER — MUPIROCIN 20 MG/G
OINTMENT TOPICAL
COMMUNITY
Start: 2021-12-22

## 2022-06-09 RX ORDER — METHOCARBAMOL 750 MG/1
TABLET, FILM COATED ORAL
COMMUNITY
Start: 2022-03-23 | End: 2023-02-01

## 2022-06-09 RX ORDER — AMOXICILLIN AND CLAVULANATE POTASSIUM 875; 125 MG/1; MG/1
1 TABLET, FILM COATED ORAL 2 TIMES DAILY
COMMUNITY
Start: 2022-05-12 | End: 2023-02-01

## 2022-06-09 RX ORDER — SOD SULF/POT CHLORIDE/MAG SULF 1.479 G
TABLET ORAL
COMMUNITY
Start: 2022-04-10 | End: 2023-02-01

## 2022-06-09 RX ORDER — BACLOFEN 10 MG/1
TABLET ORAL
COMMUNITY
Start: 2022-05-16 | End: 2023-02-01

## 2022-06-09 RX ORDER — LIDOCAINE 50 MG/G
PATCH TOPICAL
COMMUNITY
Start: 2022-05-16

## 2022-06-09 RX ORDER — METHYLPREDNISOLONE 4 MG/1
TABLET ORAL
COMMUNITY
Start: 2021-12-24 | End: 2023-02-01

## 2022-06-09 RX ORDER — TADALAFIL 20 MG/1
10-20 TABLET ORAL DAILY
COMMUNITY
Start: 2022-05-16

## 2022-06-09 RX ORDER — ALBUTEROL SULFATE 90 UG/1
2 AEROSOL, METERED RESPIRATORY (INHALATION) EVERY 4 HOURS PRN
COMMUNITY
Start: 2021-12-29 | End: 2023-02-01

## 2022-06-09 RX ORDER — HYDROCORTISONE ACETATE PRAMOXINE HCL 2.5; 1 G/100G; G/100G
CREAM TOPICAL 2 TIMES DAILY
COMMUNITY
Start: 2022-01-27 | End: 2023-02-01

## 2022-06-13 ENCOUNTER — OFFICE VISIT (OUTPATIENT)
Dept: NEUROSURGERY | Facility: CLINIC | Age: 62
End: 2022-06-13
Payer: COMMERCIAL

## 2022-06-13 VITALS
SYSTOLIC BLOOD PRESSURE: 121 MMHG | WEIGHT: 220 LBS | BODY MASS INDEX: 33.34 KG/M2 | HEIGHT: 68 IN | RESPIRATION RATE: 16 BRPM | DIASTOLIC BLOOD PRESSURE: 73 MMHG | HEART RATE: 52 BPM

## 2022-06-13 DIAGNOSIS — D32.0 BENIGN NEOPLASM OF CEREBRAL MENINGES: Primary | ICD-10-CM

## 2022-06-13 DIAGNOSIS — M47.816 LUMBAR FACET ARTHROPATHY: ICD-10-CM

## 2022-06-13 PROCEDURE — 3008F BODY MASS INDEX DOCD: CPT | Mod: CPTII,,, | Performed by: NEUROLOGICAL SURGERY

## 2022-06-13 PROCEDURE — 3074F PR MOST RECENT SYSTOLIC BLOOD PRESSURE < 130 MM HG: ICD-10-PCS | Mod: CPTII,,, | Performed by: NEUROLOGICAL SURGERY

## 2022-06-13 PROCEDURE — 3078F DIAST BP <80 MM HG: CPT | Mod: CPTII,,, | Performed by: NEUROLOGICAL SURGERY

## 2022-06-13 PROCEDURE — 3074F SYST BP LT 130 MM HG: CPT | Mod: CPTII,,, | Performed by: NEUROLOGICAL SURGERY

## 2022-06-13 PROCEDURE — 3008F PR BODY MASS INDEX (BMI) DOCUMENTED: ICD-10-PCS | Mod: CPTII,,, | Performed by: NEUROLOGICAL SURGERY

## 2022-06-13 PROCEDURE — 3078F PR MOST RECENT DIASTOLIC BLOOD PRESSURE < 80 MM HG: ICD-10-PCS | Mod: CPTII,,, | Performed by: NEUROLOGICAL SURGERY

## 2022-06-13 PROCEDURE — 99203 OFFICE O/P NEW LOW 30 MIN: CPT | Mod: ,,, | Performed by: NEUROLOGICAL SURGERY

## 2022-06-13 PROCEDURE — 1160F RVW MEDS BY RX/DR IN RCRD: CPT | Mod: CPTII,,, | Performed by: NEUROLOGICAL SURGERY

## 2022-06-13 PROCEDURE — 1160F PR REVIEW ALL MEDS BY PRESCRIBER/CLIN PHARMACIST DOCUMENTED: ICD-10-PCS | Mod: CPTII,,, | Performed by: NEUROLOGICAL SURGERY

## 2022-06-13 PROCEDURE — 1159F MED LIST DOCD IN RCRD: CPT | Mod: CPTII,,, | Performed by: NEUROLOGICAL SURGERY

## 2022-06-13 PROCEDURE — 99203 PR OFFICE/OUTPT VISIT, NEW, LEVL III, 30-44 MIN: ICD-10-PCS | Mod: ,,, | Performed by: NEUROLOGICAL SURGERY

## 2022-06-13 PROCEDURE — 1159F PR MEDICATION LIST DOCUMENTED IN MEDICAL RECORD: ICD-10-PCS | Mod: CPTII,,, | Performed by: NEUROLOGICAL SURGERY

## 2022-06-13 NOTE — PATIENT INSTRUCTIONS
1. Benign neoplasm of cerebral meninges    - No evidence of recurrent/residual tumor on recent MRI.  Recommend follow up in 2 years (10yrs postop) with another MRI.      2. Lumbar facet arthropathy     - Continue working with Dr. Coyd; recommend inversion table, weight loss, core strengthening exercises.

## 2022-06-13 NOTE — PROGRESS NOTES
[facility]  Neurosurgery  Established Patient      Reynaldo Patton   [id number]   1960       SUBJECTIVE:     History of Present Illness:  Patient is a 62 y.o. male presents for an 8 year post op appointment.  He is s/p left frontotemporal craniotomy for excision of lateral sphenoid wing meningioma that was performed on 6/24/14.  Due to persistent pain at the craniotomy site, he underwent left frontotemporal re-exploration and removal of titanium screws and plate on 10/ 25/16.  He is doing well today.  He continues with occasional headaches.  He denies any new or worsening symptoms.    He continues to complain of lower back pain with pain radiating up into the mid thoracic region on the right side.  He has pain into bilateral buttocks with burning-type sensations intermittently in the right anterior thigh.  His symptoms are increased with prolonged sitting and standing.  He is unable to sit up right due to pain.  He has to slouch.  The most painful position is sitting in a car.  He notes his lower back pain is greater than his leg pain.  He has undergone a course of physical therapy which did not help his symptoms.  He is treating with Dr. Cody and has had a few injections.  The injections provide temporary relief of pain.  He was able to go on a trip [place] recently after an injection, but says the injection wore off before he returned home.  His symptoms did not limit him on the trip despite the injection wearing off.  However, his symptoms have since returned to pre-injection base line.         Past Medical History:   Diagnosis Date    Coronary artery disease     Crohn's disease     GERD (gastroesophageal reflux disease)     Hypercholesterolemia     Hypertension       Past Surgical History:   Procedure Laterality Date    CHOLECYSTECTOMY      COLECTOMY      CORONARY STENT PLACEMENT  09/2015    [facility]    GALLBLADDER SURGERY      LEFT FRONTOTEMPORAL CRANIOTOMY FOR EXCISION FOR  MENINGIOMA  06/24/2014    [facility]    LEFT FRONTOTEMPORAL RE-EXPLORATION & REMOVAL OF SCREW/PLATE  10/25/2016    [facility]      Current Outpatient Medications   Medication Sig Dispense Refill    albuterol (PROVENTIL/VENTOLIN HFA) 90 mcg/actuation inhaler Inhale 2 puffs into the lungs every 4 (four) hours as needed.      amLODIPine (NORVASC) 10 MG tablet Take 10 mg by mouth once daily.      carvediloL (COREG) 25 MG tablet Take 25 mg by mouth 2 (two) times daily.      cetirizine (ZYRTEC) 10 MG tablet Take 10 mg by mouth once daily.      ciprofloxacin HCl (CIPRO) 500 MG tablet Take 500 mg by mouth 2 (two) times daily.      cyanocobalamin 1,000 mcg/mL injection INJECT 1 ML INTRAMUSCULARLY EVERY OTHER WEEK FOR 8 WEEKS THEN 1 ML EVERY MONTH      furosemide (LASIX) 20 MG tablet TAKE 1 TABLET BY MOUTH DAILY ON MONDAY AND FRIDAY TAKE 2 TABLETS      ibuprofen (ADVIL,MOTRIN) 800 MG tablet Take 800 mg by mouth 2 (two) times daily as needed.      KLOR-CON 10 10 mEq TbSR       LIDOcaine (LIDODERM) 5 % APPLY TO AFFECTED AREA ONCE DAILY FOR 12 HOURS MAXIMUM DURATION THEN REMOVE AND DISCARD PATCH      methocarbamoL (ROBAXIN) 750 MG Tab TAKE 1 TABLET 4 TIMES A DAY FOR 10 DAYS      methylPREDNISolone (MEDROL DOSEPACK) 4 mg tablet TAKE 6 TABLETS ON DAY 1 AS DIRECTED ON PACKAGE AND DECREASE BY 1 TAB EACH DAY FOR A TOTAL OF 6 DAYS      pantoprazole (PROTONIX) 40 MG tablet Take 40 mg by mouth once daily.      ranolazine (RANEXA) 500 MG Tb12 Take 500 mg by mouth.      rosuvastatin (CRESTOR) 20 MG tablet Take 20 mg by mouth once daily.      tadalafiL (CIALIS) 20 MG Tab Take 10-20 mg by mouth once daily.      tiZANidine (ZANAFLEX) 4 MG tablet Take 8 mg by mouth nightly.      traMADoL (ULTRAM) 50 mg tablet Take 50 mg by mouth every 6 (six) hours as needed.      VITAMIN B COMPLEX ORAL Take 1 tablet by mouth once daily.      amoxicillin-clavulanate 875-125mg (AUGMENTIN) 875-125 mg per tablet Take 1 tablet by mouth 2 (two) times  "daily.      baclofen (LIORESAL) 10 MG tablet TAKE 1 TABLET TWICE A DAY AS NEEDED FOR SEVERE PAIN      fluticasone propionate (FLONASE) 50 mcg/actuation nasal spray USE 1 SPRAY INTO EACH NOSTRIL TWICE DAILY AS NEEDED      hydrocortisone-pramoxine (ANALPRAM-HC) 2.5-1 % Crea 2 (two) times daily. Apply to affected area      mupirocin (BACTROBAN) 2 % ointment APPLY ON THE SKIN TWICE A DAY CLEAN AREA BEFORE EACH APPLICATION WITH ANTIBACTERIAL SOAP      SUTAB 1.479-0.188- 0.225 gram tablet TAKE AS DIRECTED BY YOUR PHYSICIAN. DO NOT FOLLOW INSTRUCTIONS ON BOX.       Current Facility-Administered Medications   Medication Dose Route Frequency Provider Last Rate Last Admin    hylan g-f 20 (SYNVISC ONE) 48 mg/6 mL injection 48 mg  48 mg Intra-articular 1 time in Clinic/HOD Reg Smith PA-C          Review of patient's allergies indicates:   Allergen Reactions    Strawberry Anaphylaxis      Social History     Tobacco Use    Smoking status: Never Smoker    Smokeless tobacco: Never Used   Substance Use Topics    Alcohol use: Not Currently      Family History   Problem Relation Age of Onset    Heart disease Father        Review of Systems:    Pertinent items are noted in HPI.      OBJECTIVE:     Vital Signs  Pulse: (!) 52  Resp: 16  BP: 121/73  Pain Score:   7  Height: 5' 8" (172.7 cm)  Weight: 99.8 kg (220 lb)  Body mass index is 33.45 kg/m².    General:  healthy, alert, no distress, cooperative    Head:  Normocephalic, without obvious abnormality, atraumatic    Lungs:   Breathing is quiet, non-lablored    Neurological:    Oriented to Person, Place, Time   Speech:  normal  Memory, cognition, and affect are appropriate.  Extraocular movements are intact.  Movements of facial expression are intact and symmetric.  normal 5/5 strength in all tested muscle groups  Sensation is intact.  Gait is normal        ASSESSMENT/PLAN:     1. Benign neoplasm of cerebral meninges    - No evidence of recurrent/residual tumor on " recent MRI.  Recommend follow up in 2 years (10yrs postop) with another MRI.      2. Lumbar facet arthropathy  MRI of the lumbar spine from 10/12/2021 shows multilevel disc dehydration.  There is significant broad-based bulging/herniation at L3-4 and L4-5 crowding of the exiting nerve roots at each of those 2 levels.  Facet arthropathy is only mild.  Hopefully can continue to manage with conservative therapy.  - Continue working with Dr. Cody; recommend inversion table, weight loss, core strengthening exercises.      Shannan Gan RN acting as scribe for this encounter on 06/13/2022       I, Dr. Noe Ortega, personally performed the services described in this documentation. All medical record entries made by the scribe were at my direction and in my presence.  I have reviewed the chart and agree that the record reflects my personal performance and is accurate and complete. Noe Ortega MD.  11:40 AM 06/13/2022

## 2022-06-29 ENCOUNTER — HOSPITAL ENCOUNTER (OUTPATIENT)
Dept: RADIOLOGY | Facility: CLINIC | Age: 62
Discharge: HOME | End: 2022-06-29
Attending: PHYSICIAN ASSISTANT
Payer: COMMERCIAL

## 2022-06-29 ENCOUNTER — OFFICE VISIT (OUTPATIENT)
Dept: ORTHOPEDICS | Facility: CLINIC | Age: 62
End: 2022-06-29
Payer: COMMERCIAL

## 2022-06-29 VITALS
BODY MASS INDEX: 33.34 KG/M2 | SYSTOLIC BLOOD PRESSURE: 126 MMHG | DIASTOLIC BLOOD PRESSURE: 78 MMHG | WEIGHT: 220 LBS | HEIGHT: 68 IN | HEART RATE: 60 BPM

## 2022-06-29 DIAGNOSIS — M79.645 THUMB PAIN, LEFT: Primary | ICD-10-CM

## 2022-06-29 DIAGNOSIS — M79.645 THUMB PAIN, LEFT: ICD-10-CM

## 2022-06-29 DIAGNOSIS — M65.312 TRIGGER THUMB OF LEFT HAND: ICD-10-CM

## 2022-06-29 DIAGNOSIS — M67.442 GANGLION OF FLEXOR TENDON SHEATH OF LEFT THUMB: ICD-10-CM

## 2022-06-29 PROCEDURE — 3078F PR MOST RECENT DIASTOLIC BLOOD PRESSURE < 80 MM HG: ICD-10-PCS | Mod: CPTII,,, | Performed by: PHYSICIAN ASSISTANT

## 2022-06-29 PROCEDURE — 99213 OFFICE O/P EST LOW 20 MIN: CPT | Mod: ,,, | Performed by: PHYSICIAN ASSISTANT

## 2022-06-29 PROCEDURE — 1160F RVW MEDS BY RX/DR IN RCRD: CPT | Mod: CPTII,,, | Performed by: PHYSICIAN ASSISTANT

## 2022-06-29 PROCEDURE — 1160F PR REVIEW ALL MEDS BY PRESCRIBER/CLIN PHARMACIST DOCUMENTED: ICD-10-PCS | Mod: CPTII,,, | Performed by: PHYSICIAN ASSISTANT

## 2022-06-29 PROCEDURE — 4010F PR ACE/ARB THEARPY RXD/TAKEN: ICD-10-PCS | Mod: CPTII,,, | Performed by: PHYSICIAN ASSISTANT

## 2022-06-29 PROCEDURE — 4010F ACE/ARB THERAPY RXD/TAKEN: CPT | Mod: CPTII,,, | Performed by: PHYSICIAN ASSISTANT

## 2022-06-29 PROCEDURE — 3074F SYST BP LT 130 MM HG: CPT | Mod: CPTII,,, | Performed by: PHYSICIAN ASSISTANT

## 2022-06-29 PROCEDURE — 3074F PR MOST RECENT SYSTOLIC BLOOD PRESSURE < 130 MM HG: ICD-10-PCS | Mod: CPTII,,, | Performed by: PHYSICIAN ASSISTANT

## 2022-06-29 PROCEDURE — 99213 PR OFFICE/OUTPT VISIT, EST, LEVL III, 20-29 MIN: ICD-10-PCS | Mod: ,,, | Performed by: PHYSICIAN ASSISTANT

## 2022-06-29 PROCEDURE — 1159F MED LIST DOCD IN RCRD: CPT | Mod: CPTII,,, | Performed by: PHYSICIAN ASSISTANT

## 2022-06-29 PROCEDURE — 1159F PR MEDICATION LIST DOCUMENTED IN MEDICAL RECORD: ICD-10-PCS | Mod: CPTII,,, | Performed by: PHYSICIAN ASSISTANT

## 2022-06-29 PROCEDURE — 3008F BODY MASS INDEX DOCD: CPT | Mod: CPTII,,, | Performed by: PHYSICIAN ASSISTANT

## 2022-06-29 PROCEDURE — 3008F PR BODY MASS INDEX (BMI) DOCUMENTED: ICD-10-PCS | Mod: CPTII,,, | Performed by: PHYSICIAN ASSISTANT

## 2022-06-29 PROCEDURE — 3078F DIAST BP <80 MM HG: CPT | Mod: CPTII,,, | Performed by: PHYSICIAN ASSISTANT

## 2022-06-29 PROCEDURE — 73130 X-RAY EXAM OF HAND: CPT | Mod: LT,,, | Performed by: PHYSICIAN ASSISTANT

## 2022-06-29 PROCEDURE — 73130 XR HAND COMPLETE 3 VIEW LEFT: ICD-10-PCS | Mod: LT,,, | Performed by: PHYSICIAN ASSISTANT

## 2022-06-30 NOTE — PROGRESS NOTES
"Chief Complaint:   Chief Complaint   Patient presents with    Hand Pain     LEFT INNER THUMB , STATES A SPUR HAS BEEN GROWING, IT HAS BEEN ONGOING FOR A COUPLE YEARS, DENIES INJURIES,.       History of present illness:    This is a 62 y.o. year old male who complains of a lump on the left thumb.  Reports to have the lump on the palmar surface of the left thumb and has difficulty with the metoprolol he states he has been there for quite some time but recently has began to enlarge slightly and he does begin hit it on objects at times cause of some pain    Review of Systems:    Constitution:   Denies chills, fever, and sweats.  HENT:   Denies headaches or blurry vision.  Cardiovascular:  Denies chest pain or irregular heart beat.  Respiratory:   Denies cough or shortness of breath.  Gastrointestinal:  Denies abdominal pain, nausea, or vomiting.  Musculoskeletal:   Denies muscle cramps.  Neurological:   Denies dizziness or focal weakness.  Psychiatric/Behavior: Normal mental status.  Hematology/Lymph:  Denies bleeding problem or easy bruising/bleeding.  Skin:    Denies rash or suspicious lesions.    Examination:    Vital Signs:    Vitals:    06/29/22 1006   BP: 126/78   Pulse: 60   Weight: 99.8 kg (220 lb)   Height: 5' 8" (1.727 m)       Body mass index is 33.45 kg/m².    Constitution:   Well-developed, well nourished patient in no acute distress.  Neurological:   Alert and oriented x 3 and cooperative to examination.     Psychiatric/Behavior: Normal mental status.  Respiratory:   No shortness of breath.  Eyes:    Extraoccular muscles intact  Skin:    No scars, rash or suspicious lesions.    Physical Exam:   Left hand exam  Patient does have a large palpable flexor tendon nodule of the thumb.  He is unable to flex the IP joint of the left thumb to full flexion.  Tender to palpation across the flexor tendon nodule  He has a negative Tinel at the wrist  Brisk capillary refill of the digits  In intact sensation and " motor        Imaging: X-rays ordered and images interpreted today personally by me of left hand  No acute bony osseous abnormalities are appreciated today        Assessment: Thumb pain, left  -     X-Ray Hand 3 view Left; Future; Expected date: 06/29/2022    Trigger thumb of left hand    Ganglion of flexor tendon sheath of left thumb         Plan:  This point the patient wishes to have his thumb addressed surgically.    Patient is given a tentative date for a trigger finger release the possible ganglion excision pending his appoint with Dr. Ferreira next week for surgical consult.        DISCLAIMER: This note may have been dictated using voice recognition software and may contain grammatical errors.     NOTE: Consult report sent to referring provider via [facility] EMR.

## 2022-07-06 ENCOUNTER — OFFICE VISIT (OUTPATIENT)
Dept: ORTHOPEDICS | Facility: CLINIC | Age: 62
End: 2022-07-06
Payer: COMMERCIAL

## 2022-07-06 VITALS
HEART RATE: 61 BPM | BODY MASS INDEX: 33.45 KG/M2 | WEIGHT: 220 LBS | DIASTOLIC BLOOD PRESSURE: 75 MMHG | SYSTOLIC BLOOD PRESSURE: 137 MMHG

## 2022-07-06 DIAGNOSIS — M67.442 GANGLION OF FLEXOR TENDON SHEATH OF LEFT THUMB: Primary | ICD-10-CM

## 2022-07-06 PROCEDURE — 3075F PR MOST RECENT SYSTOLIC BLOOD PRESS GE 130-139MM HG: ICD-10-PCS | Mod: CPTII,,, | Performed by: SPECIALIST

## 2022-07-06 PROCEDURE — 3075F SYST BP GE 130 - 139MM HG: CPT | Mod: CPTII,,, | Performed by: SPECIALIST

## 2022-07-06 PROCEDURE — 4010F PR ACE/ARB THEARPY RXD/TAKEN: ICD-10-PCS | Mod: CPTII,,, | Performed by: SPECIALIST

## 2022-07-06 PROCEDURE — 20550 TENDON SHEATH: ICD-10-PCS | Mod: LT,,, | Performed by: SPECIALIST

## 2022-07-06 PROCEDURE — 1159F PR MEDICATION LIST DOCUMENTED IN MEDICAL RECORD: ICD-10-PCS | Mod: CPTII,,, | Performed by: SPECIALIST

## 2022-07-06 PROCEDURE — 3008F PR BODY MASS INDEX (BMI) DOCUMENTED: ICD-10-PCS | Mod: CPTII,,, | Performed by: SPECIALIST

## 2022-07-06 PROCEDURE — 3078F PR MOST RECENT DIASTOLIC BLOOD PRESSURE < 80 MM HG: ICD-10-PCS | Mod: CPTII,,, | Performed by: SPECIALIST

## 2022-07-06 PROCEDURE — 99213 OFFICE O/P EST LOW 20 MIN: CPT | Mod: 25,,, | Performed by: SPECIALIST

## 2022-07-06 PROCEDURE — 3008F BODY MASS INDEX DOCD: CPT | Mod: CPTII,,, | Performed by: SPECIALIST

## 2022-07-06 PROCEDURE — 1160F RVW MEDS BY RX/DR IN RCRD: CPT | Mod: CPTII,,, | Performed by: SPECIALIST

## 2022-07-06 PROCEDURE — 20550 NJX 1 TENDON SHEATH/LIGAMENT: CPT | Mod: LT,,, | Performed by: SPECIALIST

## 2022-07-06 PROCEDURE — 4010F ACE/ARB THERAPY RXD/TAKEN: CPT | Mod: CPTII,,, | Performed by: SPECIALIST

## 2022-07-06 PROCEDURE — 3078F DIAST BP <80 MM HG: CPT | Mod: CPTII,,, | Performed by: SPECIALIST

## 2022-07-06 PROCEDURE — 1160F PR REVIEW ALL MEDS BY PRESCRIBER/CLIN PHARMACIST DOCUMENTED: ICD-10-PCS | Mod: CPTII,,, | Performed by: SPECIALIST

## 2022-07-06 PROCEDURE — 1159F MED LIST DOCD IN RCRD: CPT | Mod: CPTII,,, | Performed by: SPECIALIST

## 2022-07-06 PROCEDURE — 99213 PR OFFICE/OUTPT VISIT, EST, LEVL III, 20-29 MIN: ICD-10-PCS | Mod: 25,,, | Performed by: SPECIALIST

## 2022-07-06 RX ORDER — LIDOCAINE HYDROCHLORIDE 20 MG/ML
2 INJECTION, SOLUTION INFILTRATION; PERINEURAL
Status: DISCONTINUED | OUTPATIENT
Start: 2022-07-06 | End: 2022-07-06 | Stop reason: HOSPADM

## 2022-07-06 RX ORDER — BETAMETHASONE SODIUM PHOSPHATE AND BETAMETHASONE ACETATE 3; 3 MG/ML; MG/ML
3 INJECTION, SUSPENSION INTRA-ARTICULAR; INTRALESIONAL; INTRAMUSCULAR; SOFT TISSUE
Status: DISCONTINUED | OUTPATIENT
Start: 2022-07-06 | End: 2022-07-06 | Stop reason: HOSPADM

## 2022-07-06 RX ADMIN — LIDOCAINE HYDROCHLORIDE 2 ML: 20 INJECTION, SOLUTION INFILTRATION; PERINEURAL at 07:07

## 2022-07-06 RX ADMIN — BETAMETHASONE SODIUM PHOSPHATE AND BETAMETHASONE ACETATE 3 MG: 3; 3 INJECTION, SUSPENSION INTRA-ARTICULAR; INTRALESIONAL; INTRAMUSCULAR; SOFT TISSUE at 07:07

## 2022-07-06 NOTE — PROGRESS NOTES
Chief Complaint:   Chief Complaint   Patient presents with    Follow-up     LEFT THUMB TRIGGER FINGER, STATES THAT WHEN HE PRESSES ON IS IT FEELS LIKE A PULLING, DENIES PAIN         History of present illness:    This is a 62 y.o. year old male who complains of left thumb pain.  The patient is a right-handed male who on his left hand notices a lump at the base of his thumb it is painful at times when he  things.  He still uses an situ physical activities he is not experiencing numbness and tingling.      Past Medical History:   Diagnosis Date    Coronary artery disease     Crohn's disease     GERD (gastroesophageal reflux disease)     Hypercholesterolemia     Hypertension        Past Surgical History:   Procedure Laterality Date    CHOLECYSTECTOMY      COLECTOMY      CORONARY STENT PLACEMENT  09/2015    [facility]    GALLBLADDER SURGERY      LEFT FRONTOTEMPORAL CRANIOTOMY FOR EXCISION FOR MENINGIOMA  06/24/2014    [facility]    LEFT FRONTOTEMPORAL RE-EXPLORATION & REMOVAL OF SCREW/PLATE  10/25/2016    [facility]       Current Outpatient Medications   Medication Instructions    albuterol (PROVENTIL/VENTOLIN HFA) 90 mcg/actuation inhaler 2 puffs, Inhalation, Every 4 hours PRN    amLODIPine (NORVASC) 10 mg, Oral, Daily    amoxicillin-clavulanate 875-125mg (AUGMENTIN) 875-125 mg per tablet 1 tablet, Oral, 2 times daily    baclofen (LIORESAL) 10 MG tablet TAKE 1 TABLET TWICE A DAY AS NEEDED FOR SEVERE PAIN    carvediloL (COREG) 25 mg, Oral, 2 times daily    cetirizine (ZYRTEC) 10 mg, Oral, Daily    ciprofloxacin HCl (CIPRO) 500 mg, Oral, 2 times daily    cyanocobalamin 1,000 mcg/mL injection INJECT 1 ML INTRAMUSCULARLY EVERY OTHER WEEK FOR 8 WEEKS THEN 1 ML EVERY MONTH    fluticasone propionate (FLONASE) 50 mcg/actuation nasal spray USE 1 SPRAY INTO EACH NOSTRIL TWICE DAILY AS NEEDED    furosemide (LASIX) 20 MG tablet TAKE 1 TABLET BY MOUTH DAILY ON MONDAY AND FRIDAY TAKE 2 TABLETS     hydrocortisone-pramoxine (ANALPRAM-HC) 2.5-1 % Crea 2 times daily, Apply to affected area    ibuprofen (ADVIL,MOTRIN) 800 mg, Oral, 2 times daily PRN    KLOR-CON 10 10 mEq TbSR No dose, route, or frequency recorded.    LIDOcaine (LIDODERM) 5 % APPLY TO AFFECTED AREA ONCE DAILY FOR 12 HOURS MAXIMUM DURATION THEN REMOVE AND DISCARD PATCH    methocarbamoL (ROBAXIN) 750 MG Tab TAKE 1 TABLET 4 TIMES A DAY FOR 10 DAYS    methylPREDNISolone (MEDROL DOSEPACK) 4 mg tablet TAKE 6 TABLETS ON DAY 1 AS DIRECTED ON PACKAGE AND DECREASE BY 1 TAB EACH DAY FOR A TOTAL OF 6 DAYS    mupirocin (BACTROBAN) 2 % ointment APPLY ON THE SKIN TWICE A DAY CLEAN AREA BEFORE EACH APPLICATION WITH ANTIBACTERIAL SOAP    pantoprazole (PROTONIX) 40 mg, Oral, Daily    ranolazine (RANEXA) 500 mg, Oral    rosuvastatin (CRESTOR) 20 mg, Oral, Daily    SUTAB 1.479-0.188- 0.225 gram tablet TAKE AS DIRECTED BY YOUR PHYSICIAN. DO NOT FOLLOW INSTRUCTIONS ON BOX.    tadalafiL (CIALIS) 10-20 mg, Oral, Daily    tiZANidine (ZANAFLEX) 8 mg, Oral, Nightly    traMADoL (ULTRAM) 50 mg, Oral, Every 6 hours PRN    VITAMIN B COMPLEX ORAL 1 tablet, Oral, Daily        Review of patient's allergies indicates:   Allergen Reactions    Strawberry Anaphylaxis       Family History   Problem Relation Age of Onset    Heart disease Father            Review of Systems:    Constitution:   Denies chills, fever, and sweats.  HENT:   Denies headaches or blurry vision.  Cardiovascular:  Denies chest pain or irregular heart beat.  Respiratory:   Denies cough or shortness of breath.  Gastrointestinal:  Denies abdominal pain, nausea, or vomiting.  Musculoskeletal:   Denies muscle cramps.  Neurological:   Denies dizziness or focal weakness.  Psychiatric/Behavior: Normal mental status.  Hematology/Lymph:  Denies bleeding problem or easy bruising/bleeding.  Skin:    Denies rash or suspicious lesions.    Examination:    Vital Signs:    Vitals:    07/06/22 0755   BP: 137/75    Pulse: 61   Weight: 99.8 kg (220 lb)       Body mass index is 33.45 kg/m².    Constitution:   Well-developed, well nourished patient in no acute distress.  Neurological:   Alert and oriented x 3 and cooperative to examination.     Psychiatric/Behavior: Normal mental status.  Respiratory:   No shortness of breath.  Eyes:    Extraoccular muscles intact  Skin:    No scars, rash or suspicious lesions.    Musculoskeletal Exam :   Examination of the patient's left thumb is assisted images of the metacarpophalangeal joint it is on the volar aspect it's we used typically see a ganglion associated with a trigger thumb.    An injection was carried out into the cyst this is ruptured he is going to continue with a Band-Aid for today and return back to activities if     Assessment: Final diagnoses:  None    Plan: if  it recurs he will return for follow-up visit      DISCLAIMER: This note may have been dictated using voice recognition software and may contain grammatical errors.     NOTE: Consult report sent to referring provider via [employer] EMR.

## 2022-07-06 NOTE — PROCEDURES
Tendon Sheath    Date/Time: 7/6/2022 7:45 AM  Performed by: Christophe Barber MD  Authorized by: Christophe Barber MD     Indications:  Joint swelling  Timeout: prior to procedure the correct patient, procedure, and site was verified    Local anesthesia used?: Yes    Anesthesia:  Local infiltration  Location:  Thumb  Site:  L thumb MCP  Medications:  2 mL LIDOcaine HCL 20 mg/ml (2%) 20 mg/mL (2 %); 3 mg betamethasone acetate-betamethasone sodium phosphate 6 mg/mL  Patient tolerance:  Patient tolerated the procedure well with no immediate complications

## 2022-09-01 ENCOUNTER — HOSPITAL ENCOUNTER (OUTPATIENT)
Dept: RADIOLOGY | Facility: HOSPITAL | Age: 62
Discharge: HOME | End: 2022-09-01
Attending: NURSE PRACTITIONER
Payer: COMMERCIAL

## 2022-09-01 DIAGNOSIS — R79.9 ABNORMAL BLOOD CHEMISTRY: ICD-10-CM

## 2022-09-01 DIAGNOSIS — K50.80 REGIONAL ENTERITIS OF SMALL INTESTINE WITH LARGE INTESTINE: Primary | ICD-10-CM

## 2022-09-01 DIAGNOSIS — K21.9 GASTROESOPHAGEAL REFLUX DISEASE, UNSPECIFIED WHETHER ESOPHAGITIS PRESENT: ICD-10-CM

## 2022-09-01 DIAGNOSIS — I10 ESSENTIAL HYPERTENSION, MALIGNANT: ICD-10-CM

## 2022-09-01 DIAGNOSIS — Z79.899 ENCOUNTER FOR LONG-TERM (CURRENT) USE OF OTHER MEDICATIONS: ICD-10-CM

## 2022-09-01 DIAGNOSIS — E55.9 AVITAMINOSIS D: ICD-10-CM

## 2022-09-01 DIAGNOSIS — K63.5 HYPERPLASTIC POLYP OF INTESTINE: ICD-10-CM

## 2022-09-01 DIAGNOSIS — K50.80 REGIONAL ENTERITIS OF SMALL INTESTINE WITH LARGE INTESTINE: ICD-10-CM

## 2022-09-01 PROCEDURE — 71045 X-RAY EXAM CHEST 1 VIEW: CPT | Mod: TC

## 2022-09-16 ENCOUNTER — HISTORICAL (OUTPATIENT)
Dept: ADMINISTRATIVE | Facility: HOSPITAL | Age: 62
End: 2022-09-16
Payer: COMMERCIAL

## 2023-01-06 DIAGNOSIS — G47.33 OSA (OBSTRUCTIVE SLEEP APNEA): Primary | ICD-10-CM

## 2023-02-01 ENCOUNTER — OFFICE VISIT (OUTPATIENT)
Dept: NEUROLOGY | Facility: CLINIC | Age: 63
End: 2023-02-01
Payer: COMMERCIAL

## 2023-02-01 VITALS
HEIGHT: 68 IN | SYSTOLIC BLOOD PRESSURE: 118 MMHG | BODY MASS INDEX: 33.34 KG/M2 | WEIGHT: 220 LBS | DIASTOLIC BLOOD PRESSURE: 78 MMHG

## 2023-02-01 DIAGNOSIS — R06.81 WITNESSED APNEIC SPELLS: ICD-10-CM

## 2023-02-01 DIAGNOSIS — G47.19 EXCESSIVE DAYTIME SLEEPINESS: ICD-10-CM

## 2023-02-01 DIAGNOSIS — I10 HYPERTENSION, UNSPECIFIED TYPE: ICD-10-CM

## 2023-02-01 DIAGNOSIS — G47.33 OSA (OBSTRUCTIVE SLEEP APNEA): Primary | ICD-10-CM

## 2023-02-01 DIAGNOSIS — G47.33 OSA (OBSTRUCTIVE SLEEP APNEA): ICD-10-CM

## 2023-02-01 PROCEDURE — 99204 OFFICE O/P NEW MOD 45 MIN: CPT | Mod: S$GLB,,, | Performed by: NURSE PRACTITIONER

## 2023-02-01 PROCEDURE — 1160F PR REVIEW ALL MEDS BY PRESCRIBER/CLIN PHARMACIST DOCUMENTED: ICD-10-PCS | Mod: CPTII,S$GLB,, | Performed by: NURSE PRACTITIONER

## 2023-02-01 PROCEDURE — 3078F PR MOST RECENT DIASTOLIC BLOOD PRESSURE < 80 MM HG: ICD-10-PCS | Mod: CPTII,S$GLB,, | Performed by: NURSE PRACTITIONER

## 2023-02-01 PROCEDURE — 3008F PR BODY MASS INDEX (BMI) DOCUMENTED: ICD-10-PCS | Mod: CPTII,S$GLB,, | Performed by: NURSE PRACTITIONER

## 2023-02-01 PROCEDURE — 3074F SYST BP LT 130 MM HG: CPT | Mod: CPTII,S$GLB,, | Performed by: NURSE PRACTITIONER

## 2023-02-01 PROCEDURE — 1160F RVW MEDS BY RX/DR IN RCRD: CPT | Mod: CPTII,S$GLB,, | Performed by: NURSE PRACTITIONER

## 2023-02-01 PROCEDURE — 3078F DIAST BP <80 MM HG: CPT | Mod: CPTII,S$GLB,, | Performed by: NURSE PRACTITIONER

## 2023-02-01 PROCEDURE — 1159F MED LIST DOCD IN RCRD: CPT | Mod: CPTII,S$GLB,, | Performed by: NURSE PRACTITIONER

## 2023-02-01 PROCEDURE — 99999 PR PBB SHADOW E&M-EST. PATIENT-LVL IV: CPT | Mod: PBBFAC,,, | Performed by: NURSE PRACTITIONER

## 2023-02-01 PROCEDURE — 99999 PR PBB SHADOW E&M-EST. PATIENT-LVL IV: ICD-10-PCS | Mod: PBBFAC,,, | Performed by: NURSE PRACTITIONER

## 2023-02-01 PROCEDURE — 3008F BODY MASS INDEX DOCD: CPT | Mod: CPTII,S$GLB,, | Performed by: NURSE PRACTITIONER

## 2023-02-01 PROCEDURE — 1159F PR MEDICATION LIST DOCUMENTED IN MEDICAL RECORD: ICD-10-PCS | Mod: CPTII,S$GLB,, | Performed by: NURSE PRACTITIONER

## 2023-02-01 PROCEDURE — 3074F PR MOST RECENT SYSTOLIC BLOOD PRESSURE < 130 MM HG: ICD-10-PCS | Mod: CPTII,S$GLB,, | Performed by: NURSE PRACTITIONER

## 2023-02-01 PROCEDURE — 99204 PR OFFICE/OUTPT VISIT, NEW, LEVL IV, 45-59 MIN: ICD-10-PCS | Mod: S$GLB,,, | Performed by: NURSE PRACTITIONER

## 2023-02-01 NOTE — PROGRESS NOTES
Neurology Note - Initial Encounter    Subjective:         Patient ID: Reynaldo Patton is a 62 y.o. male.    Chief Complaint: NP ref by Dr Childress for neuro cons to eval for VIOLET.     HPI:              Unrefreshed: yes  Witnessed apnea: yes  Loud snoring: yes  Excessive daytime sleepiness: yes  Insomnia: yes; sleep maintenance insomnia  Nocturia: yes; approx 5 times per night  Naps: yes; 1-2 times a day for 45 min-1 hr.     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     EPWORTH SLEEPINESS SCALE 2/1/2023   Sitting and reading 2   Watching TV 2   Sitting, inactive in a public place (e.g. a theatre or a meeting) 1   As a passenger in a car for an hour without a break 2   Lying down to rest in the afternoon when circumstances permit 3   Sitting and talking to someone 0   Sitting quietly after a lunch without alcohol 1   In a car, while stopped for a few minutes in traffic 0   Total score 11       ROS: as per HPI, otherwise pertinent systems review is negative          Past Medical History:   Diagnosis Date    Coronary artery disease     Crohn's disease     GERD (gastroesophageal reflux disease)     Hypercholesterolemia     Hypertension        Past Surgical History:   Procedure Laterality Date    CHOLECYSTECTOMY      COLECTOMY      CORONARY STENT PLACEMENT  09/2015    [facility]    GALLBLADDER SURGERY      LEFT FRONTOTEMPORAL CRANIOTOMY FOR EXCISION FOR MENINGIOMA  06/24/2014    [facility]    LEFT FRONTOTEMPORAL RE-EXPLORATION & REMOVAL OF SCREW/PLATE  10/25/2016    [facility]       Family History   Problem Relation Age of Onset    Heart disease Father        Social History     Socioeconomic History    Marital status:    Tobacco Use    Smoking status: Never    Smokeless tobacco: Never   Substance and Sexual Activity    Alcohol use: Not Currently    Drug use: Never  "      Review of patient's allergies indicates:   Allergen Reactions    Strawberry Anaphylaxis         Current Outpatient Medications:     amLODIPine (NORVASC) 10 MG tablet, Take 10 mg by mouth once daily., Disp: , Rfl:     carvediloL (COREG) 25 MG tablet, Take 25 mg by mouth 2 (two) times daily., Disp: , Rfl:     cyanocobalamin 1,000 mcg/mL injection, INJECT 1 ML INTRAMUSCULARLY EVERY OTHER WEEK FOR 8 WEEKS THEN 1 ML EVERY MONTH, Disp: , Rfl:     fluticasone propionate (FLONASE) 50 mcg/actuation nasal spray, USE 1 SPRAY INTO EACH NOSTRIL TWICE DAILY AS NEEDED, Disp: , Rfl:     furosemide (LASIX) 20 MG tablet, TAKE 1 TABLET BY MOUTH DAILY ON MONDAY AND FRIDAY TAKE 2 TABLETS, Disp: , Rfl:     KLOR-CON 10 10 mEq TbSR, , Disp: , Rfl:     LIDOcaine (LIDODERM) 5 %, APPLY TO AFFECTED AREA ONCE DAILY FOR 12 HOURS MAXIMUM DURATION THEN REMOVE AND DISCARD PATCH, Disp: , Rfl:     mupirocin (BACTROBAN) 2 % ointment, APPLY ON THE SKIN TWICE A DAY CLEAN AREA BEFORE EACH APPLICATION WITH ANTIBACTERIAL SOAP, Disp: , Rfl:     pantoprazole (PROTONIX) 40 MG tablet, Take 40 mg by mouth once daily., Disp: , Rfl:     ranolazine (RANEXA) 500 MG Tb12, Take 500 mg by mouth., Disp: , Rfl:     rosuvastatin (CRESTOR) 20 MG tablet, Take 20 mg by mouth once daily., Disp: , Rfl:     tadalafiL (CIALIS) 20 MG Tab, Take 10-20 mg by mouth once daily., Disp: , Rfl:     tiZANidine (ZANAFLEX) 4 MG tablet, Take 8 mg by mouth nightly., Disp: , Rfl:     VITAMIN B COMPLEX ORAL, Take 1 tablet by mouth once daily., Disp: , Rfl:     Current Facility-Administered Medications:     hylan g-f 20 (SYNVISC ONE) 48 mg/6 mL injection 48 mg, 48 mg, Intra-articular, 1 time in Clinic/HOD, Reg Smith PA-C     Objective:      Exam:   /78   Ht 5' 8" (1.727 m)   Wt 99.8 kg (220 lb)   BMI 33.45 kg/m²     Physical Exam  Vitals reviewed.   Constitutional:       Appearance: Normal appearance. overweight     Accompanied by: alone  HENT:      Ears:      Comments: " "Hearing normal.     Mallampati: 4; sm uvula     Neck Circumference: 20"  Eyes:      Extraocular Movements: Extraocular movements intact.      VF's ok  Cardiovascular:      Rate and Rhythm: Normal rate and regular rhythm.   Pulmonary:      Effort: Pulmonary effort is normal.      Breath sounds: Normal breath sounds.   Musculoskeletal:         General: Normal range of motion.   Skin:     General: Skin is warm and dry.   Neurological:      General: No focal deficit present.      Mental Status: He is alert and oriented to person, place, and time.      Speech: nml     Face: symmetric; tongue midline     Motor: nonlateralizing     Gait unassisted and normal.  Psychiatric:         Mood and Affect: Mood normal.         Behavior: Behavior normal.         Assessment/Plan:     Problem List Items Addressed This Visit          Pulmonary    Witnessed apneic spells       Cardiac/Vascular    Hypertension       Other    Possible VIOLET - Primary    PSG    Lengthy discussion about the risks of untreated sleep apnea. Testing for sleep apnea discussed. Potential need for treatment of VIOLET discussed including CPAP or Bilevel PAP. Alternatives to PAP discussed if PAP not ultimately tolerated. Risks of drowsy driving discussed.    Advise he diet, exercise, and lose weight    Follow up TBD    Excessive daytime sleepiness    Loud Snoring  Insomnia          Donavan Srivastava, MSN, APRN, AGACNP-BC        "

## 2023-02-02 ENCOUNTER — PROCEDURE VISIT (OUTPATIENT)
Dept: SLEEP MEDICINE | Facility: HOSPITAL | Age: 63
End: 2023-02-02
Attending: NURSE PRACTITIONER
Payer: COMMERCIAL

## 2023-02-02 DIAGNOSIS — R06.81 WITNESSED APNEIC SPELLS: ICD-10-CM

## 2023-02-02 DIAGNOSIS — G47.19 EXCESSIVE DAYTIME SLEEPINESS: ICD-10-CM

## 2023-02-02 DIAGNOSIS — G47.33 OSA (OBSTRUCTIVE SLEEP APNEA): Primary | ICD-10-CM

## 2023-02-02 DIAGNOSIS — I10 HYPERTENSION, UNSPECIFIED TYPE: ICD-10-CM

## 2023-02-02 PROCEDURE — 95810 POLYSOM 6/> YRS 4/> PARAM: CPT | Mod: 26,,, | Performed by: SPECIALIST

## 2023-02-02 PROCEDURE — 95810 POLYSOM 6/> YRS 4/> PARAM: CPT

## 2023-02-02 PROCEDURE — 95810 PR POLYSOMNOGRAPHY, 4 OR MORE: ICD-10-PCS | Mod: 26,,, | Performed by: SPECIALIST

## 2023-02-08 ENCOUNTER — PROCEDURE VISIT (OUTPATIENT)
Dept: SLEEP MEDICINE | Facility: HOSPITAL | Age: 63
End: 2023-02-08
Attending: SPECIALIST
Payer: COMMERCIAL

## 2023-02-08 DIAGNOSIS — G47.33 OSA (OBSTRUCTIVE SLEEP APNEA): Primary | ICD-10-CM

## 2023-02-08 PROCEDURE — 95811 POLYSOM 6/>YRS CPAP 4/> PARM: CPT

## 2023-02-08 PROCEDURE — 95811 PR POLYSOMNOGRAPHY W/CPAP: ICD-10-PCS | Mod: 26,,, | Performed by: SPECIALIST

## 2023-02-08 PROCEDURE — 95783 POLYSOM <6 YRS CPAP/BILVL: CPT

## 2023-02-08 PROCEDURE — 95811 POLYSOM 6/>YRS CPAP 4/> PARM: CPT | Mod: 26,,, | Performed by: SPECIALIST

## 2023-02-16 ENCOUNTER — OFFICE VISIT (OUTPATIENT)
Dept: NEUROLOGY | Facility: CLINIC | Age: 63
End: 2023-02-16
Payer: COMMERCIAL

## 2023-02-16 VITALS
HEIGHT: 68 IN | SYSTOLIC BLOOD PRESSURE: 160 MMHG | DIASTOLIC BLOOD PRESSURE: 82 MMHG | BODY MASS INDEX: 33.34 KG/M2 | WEIGHT: 220 LBS

## 2023-02-16 DIAGNOSIS — G47.33 OBSTRUCTIVE SLEEP APNEA: Primary | ICD-10-CM

## 2023-02-16 PROCEDURE — 1159F PR MEDICATION LIST DOCUMENTED IN MEDICAL RECORD: ICD-10-PCS | Mod: CPTII,S$GLB,, | Performed by: NURSE PRACTITIONER

## 2023-02-16 PROCEDURE — 99213 OFFICE O/P EST LOW 20 MIN: CPT | Mod: S$GLB,,, | Performed by: NURSE PRACTITIONER

## 2023-02-16 PROCEDURE — 3077F PR MOST RECENT SYSTOLIC BLOOD PRESSURE >= 140 MM HG: ICD-10-PCS | Mod: CPTII,S$GLB,, | Performed by: NURSE PRACTITIONER

## 2023-02-16 PROCEDURE — 99213 PR OFFICE/OUTPT VISIT, EST, LEVL III, 20-29 MIN: ICD-10-PCS | Mod: S$GLB,,, | Performed by: NURSE PRACTITIONER

## 2023-02-16 PROCEDURE — 3077F SYST BP >= 140 MM HG: CPT | Mod: CPTII,S$GLB,, | Performed by: NURSE PRACTITIONER

## 2023-02-16 PROCEDURE — 99999 PR PBB SHADOW E&M-EST. PATIENT-LVL III: ICD-10-PCS | Mod: PBBFAC,,, | Performed by: NURSE PRACTITIONER

## 2023-02-16 PROCEDURE — 3079F PR MOST RECENT DIASTOLIC BLOOD PRESSURE 80-89 MM HG: ICD-10-PCS | Mod: CPTII,S$GLB,, | Performed by: NURSE PRACTITIONER

## 2023-02-16 PROCEDURE — 1159F MED LIST DOCD IN RCRD: CPT | Mod: CPTII,S$GLB,, | Performed by: NURSE PRACTITIONER

## 2023-02-16 PROCEDURE — 3079F DIAST BP 80-89 MM HG: CPT | Mod: CPTII,S$GLB,, | Performed by: NURSE PRACTITIONER

## 2023-02-16 PROCEDURE — 99999 PR PBB SHADOW E&M-EST. PATIENT-LVL III: CPT | Mod: PBBFAC,,, | Performed by: NURSE PRACTITIONER

## 2023-02-16 PROCEDURE — 3008F BODY MASS INDEX DOCD: CPT | Mod: CPTII,S$GLB,, | Performed by: NURSE PRACTITIONER

## 2023-02-16 PROCEDURE — 3008F PR BODY MASS INDEX (BMI) DOCUMENTED: ICD-10-PCS | Mod: CPTII,S$GLB,, | Performed by: NURSE PRACTITIONER

## 2023-02-16 NOTE — PROGRESS NOTES
Established VIOLET Patient   SUBJECTIVE:    Patient ID: Reynaldo Patton , 63 y.o.    Past Medical History:   Diagnosis Date    Coronary artery disease     Crohn's disease     GERD (gastroesophageal reflux disease)     Hypercholesterolemia     Hypertension        Past Surgical History:   Procedure Laterality Date    CHOLECYSTECTOMY      COLECTOMY      CORONARY STENT PLACEMENT  09/2015    [facility]    GALLBLADDER SURGERY      LEFT FRONTOTEMPORAL CRANIOTOMY FOR EXCISION FOR MENINGIOMA  06/24/2014    [facility]    LEFT FRONTOTEMPORAL RE-EXPLORATION & REMOVAL OF SCREW/PLATE  10/25/2016    [facility]       Family History   Problem Relation Age of Onset    Heart disease Father        Social History     Socioeconomic History    Marital status:    Tobacco Use    Smoking status: Never    Smokeless tobacco: Never   Substance and Sexual Activity    Alcohol use: Not Currently    Drug use: Never       Review of patient's allergies indicates:   Allergen Reactions    Strawberry Anaphylaxis       Chief Complaint: PSG/PAP titration results    History of Present Illness:     Here for PSG/PAP titration results    Pt sleeps 3-4 hrs a night and awakens 3-4 hrs a night and awakens 5 times due to nocturia and has a hard time going back to sleep. Awakens un refreshed and has EDS. Naps 1-2 times a day. Awakens un refreshed and has EDS. Pt snores.     Review of Systems - as per HPI, otherwise a balanced 10 systems review is negative.      Current Medications:    Current Outpatient Medications:     amLODIPine (NORVASC) 10 MG tablet, Take 10 mg by mouth once daily., Disp: , Rfl:     carvediloL (COREG) 25 MG tablet, Take 25 mg by mouth 2 (two) times daily., Disp: , Rfl:     cyanocobalamin 1,000 mcg/mL injection, INJECT 1 ML INTRAMUSCULARLY EVERY OTHER WEEK FOR 8 WEEKS THEN 1 ML EVERY MONTH, Disp: , Rfl:     fluticasone propionate (FLONASE) 50 mcg/actuation nasal spray, USE 1 SPRAY INTO EACH NOSTRIL TWICE DAILY AS NEEDED, Disp: , Rfl:      "furosemide (LASIX) 20 MG tablet, TAKE 1 TABLET BY MOUTH DAILY ON MONDAY AND FRIDAY TAKE 2 TABLETS, Disp: , Rfl:     KLOR-CON 10 10 mEq TbSR, , Disp: , Rfl:     LIDOcaine (LIDODERM) 5 %, APPLY TO AFFECTED AREA ONCE DAILY FOR 12 HOURS MAXIMUM DURATION THEN REMOVE AND DISCARD PATCH, Disp: , Rfl:     mupirocin (BACTROBAN) 2 % ointment, APPLY ON THE SKIN TWICE A DAY CLEAN AREA BEFORE EACH APPLICATION WITH ANTIBACTERIAL SOAP, Disp: , Rfl:     pantoprazole (PROTONIX) 40 MG tablet, Take 40 mg by mouth once daily., Disp: , Rfl:     ranolazine (RANEXA) 500 MG Tb12, Take 500 mg by mouth., Disp: , Rfl:     rosuvastatin (CRESTOR) 20 MG tablet, Take 20 mg by mouth once daily., Disp: , Rfl:     tadalafiL (CIALIS) 20 MG Tab, Take 10-20 mg by mouth once daily., Disp: , Rfl:     tiZANidine (ZANAFLEX) 4 MG tablet, Take 8 mg by mouth nightly., Disp: , Rfl:     VITAMIN B COMPLEX ORAL, Take 1 tablet by mouth once daily., Disp: , Rfl:     Current Facility-Administered Medications:     hylan g-f 20 (SYNVISC ONE) 48 mg/6 mL injection 48 mg, 48 mg, Intra-articular, 1 time in Clinic/HOD, Reg Smith PA-C      OBJECTIVE:    Vitals:  BP (!) 160/82   Ht 5' 8" (1.727 m)   Wt 99.8 kg (220 lb)   BMI 33.45 kg/m²      Physical Exam:  Constitutional  he appears well-developed and well-nourished. he is well groomed.    Accompanied by - self  Appearance - well appearing, no apparent distress, unassisted  Skin- no obvious lesions noted    Neurologic  Cortical function - The patient is alert, attentive, and oriented  Speech - clear   Cranial nerves:  CN 3, 4, 6 EOMs - normal. No ptosis or lateral gaze deviation  CN 7 - no face asymmetry; normal eye closure and smile  CN 8 - hearing is grossly normal  Motor - grossly normal  Gait - unassisted; posture upright. gait is steady with normal steps    Review of Data:        EPWORTH SLEEPINESS SCALE 2/16/2023   Sitting and reading 2   Watching TV 2   Sitting, inactive in a public place (e.g. a theatre " or a meeting) 1   As a passenger in a car for an hour without a break 2   Lying down to rest in the afternoon when circumstances permit 3   Sitting and talking to someone 0   Sitting quietly after a lunch without alcohol 1   In a car, while stopped for a few minutes in traffic 0   Total score 11       Labs:  No visits with results within 3 Month(s) from this visit.   Latest known visit with results is:   Historical on 09/16/2022   Component Date Value Ref Range Status    Rapid Group A Strep 05/15/2019 Negative   Final          ASSESSMENT /PLAN:    Problem List Items Addressed This Visit          Other    Obstructive sleep apnea - Primary    Reviewed PSG and PAP titration reports with pt (see detailed results and interpretation scanned into chart). Suggest autoPAP with pressures from 10-18 cm.    Encouraged nightly use of PAP. Discussed minimum insurance guidelines for PAP use    Reminded pt that drowsy driving may still occur despite PAP use.    Follow up requested in 2-3 mo. Instructed pt to call prior if needed            Questions and concerns were sought and answered to the patient's stated verbal satisfaction.    The patient verbalizes understanding and agreement with the above stated treatment plan.   Dr. Alfaro was available during today's encounter.     Items discussed include acute and/or chronic neurological, sleep, or other issues and their attendant differential diagnoses.  Potential for additional testing, treatment options, and prognosis also discussed.    __*_single dx ___multiple issues/ diagnoses  ___ low __* mod ___ high complexity of data  __*_low __mod ___ high risks     Medical Decision Making (MDM) used for CPT choice:  _*__low  ___moderate  ____high        RAF Shea  [facility]  [phone]

## 2023-04-21 ENCOUNTER — OFFICE VISIT (OUTPATIENT)
Dept: ORTHOPEDICS | Facility: CLINIC | Age: 63
End: 2023-04-21
Payer: COMMERCIAL

## 2023-04-21 ENCOUNTER — HOSPITAL ENCOUNTER (OUTPATIENT)
Dept: RADIOLOGY | Facility: CLINIC | Age: 63
Discharge: HOME | End: 2023-04-21
Attending: PHYSICIAN ASSISTANT
Payer: COMMERCIAL

## 2023-04-21 VITALS — WEIGHT: 220 LBS | BODY MASS INDEX: 32.58 KG/M2 | HEIGHT: 69 IN

## 2023-04-21 DIAGNOSIS — M25.561 RIGHT KNEE PAIN, UNSPECIFIED CHRONICITY: ICD-10-CM

## 2023-04-21 DIAGNOSIS — M54.9 BACK PAIN, UNSPECIFIED BACK LOCATION, UNSPECIFIED BACK PAIN LATERALITY, UNSPECIFIED CHRONICITY: Primary | ICD-10-CM

## 2023-04-21 DIAGNOSIS — M54.16 LUMBAR RADICULOPATHY: ICD-10-CM

## 2023-04-21 DIAGNOSIS — M25.561 RIGHT KNEE PAIN, UNSPECIFIED CHRONICITY: Primary | ICD-10-CM

## 2023-04-21 DIAGNOSIS — R52 PAIN: ICD-10-CM

## 2023-04-21 PROCEDURE — 73523 X-RAY EXAM HIPS BI 5/> VIEWS: CPT | Mod: ,,, | Performed by: PHYSICIAN ASSISTANT

## 2023-04-21 PROCEDURE — 1159F PR MEDICATION LIST DOCUMENTED IN MEDICAL RECORD: ICD-10-PCS | Mod: CPTII,,, | Performed by: PHYSICIAN ASSISTANT

## 2023-04-21 PROCEDURE — 1160F RVW MEDS BY RX/DR IN RCRD: CPT | Mod: CPTII,,, | Performed by: PHYSICIAN ASSISTANT

## 2023-04-21 PROCEDURE — 99213 PR OFFICE/OUTPT VISIT, EST, LEVL III, 20-29 MIN: ICD-10-PCS | Mod: ,,, | Performed by: PHYSICIAN ASSISTANT

## 2023-04-21 PROCEDURE — 73564 XR KNEE COMP 4 OR MORE VIEWS RIGHT: ICD-10-PCS | Mod: RT,,, | Performed by: PHYSICIAN ASSISTANT

## 2023-04-21 PROCEDURE — 3008F BODY MASS INDEX DOCD: CPT | Mod: CPTII,,, | Performed by: PHYSICIAN ASSISTANT

## 2023-04-21 PROCEDURE — 73523 XR HIP 5 OR MORE VIEWS BILATERAL: ICD-10-PCS | Mod: ,,, | Performed by: PHYSICIAN ASSISTANT

## 2023-04-21 PROCEDURE — 1159F MED LIST DOCD IN RCRD: CPT | Mod: CPTII,,, | Performed by: PHYSICIAN ASSISTANT

## 2023-04-21 PROCEDURE — 73564 X-RAY EXAM KNEE 4 OR MORE: CPT | Mod: RT,,, | Performed by: PHYSICIAN ASSISTANT

## 2023-04-21 PROCEDURE — 99213 OFFICE O/P EST LOW 20 MIN: CPT | Mod: ,,, | Performed by: PHYSICIAN ASSISTANT

## 2023-04-21 PROCEDURE — 3008F PR BODY MASS INDEX (BMI) DOCUMENTED: ICD-10-PCS | Mod: CPTII,,, | Performed by: PHYSICIAN ASSISTANT

## 2023-04-21 PROCEDURE — 1160F PR REVIEW ALL MEDS BY PRESCRIBER/CLIN PHARMACIST DOCUMENTED: ICD-10-PCS | Mod: CPTII,,, | Performed by: PHYSICIAN ASSISTANT

## 2023-04-24 NOTE — PROGRESS NOTES
"Chief Complaint:   Chief Complaint   Patient presents with    Knee Pain     R knee pain did a round of synvisc one 05/2022 states it did help. had gradually to have increase pain radiating up mid thigh radiating more to the back. occasional swelling.        History of present illness:    This is a 63 y.o. year old male who complains of right knee pain.    Patient is also complaining of right anterior thigh pain radiating around from the back into his groin son.  Line he states it happens bilaterally but worse on the right than the left.    He did receive Synvisc injection approximately a year ago and also a lumbar epidural steroid injection not long afterwards and went in his cruise.  Line he did well for a while with the increased walking was causing him pain.    Recently the more he walks and ambulates the more he is having the pain in the leg.    Not specifically in the knee itself    Review of Systems:    Constitution:   Denies chills, fever, and sweats.  HENT:   Denies headaches or blurry vision.  Cardiovascular:  Denies chest pain or irregular heart beat.  Respiratory:   Denies cough or shortness of breath.  Gastrointestinal:  Denies abdominal pain, nausea, or vomiting.  Musculoskeletal:   Denies muscle cramps.  Neurological:   Denies dizziness or focal weakness.  Psychiatric/Behavior: Normal mental status.  Hematology/Lymph:  Denies bleeding problem or easy bruising/bleeding.  Skin:    Denies rash or suspicious lesions.    Examination:    Vital Signs:    Vitals:    04/21/23 0842   Weight: 99.8 kg (220 lb)   Height: 5' 9" (1.753 m)   PainSc:   9       Body mass index is 32.49 kg/m².    Constitution:   Well-developed, well nourished patient in no acute distress.  Neurological:   Alert and oriented x 3 and cooperative to examination.     Psychiatric/Behavior: Normal mental status.  Respiratory:   No shortness of breath.  Eyes:    Extraoccular muscles intact  Skin:    No scars, rash or suspicious " lesions.    Physical Exam:   Lumbar Exam     No swelling, erythema or increased heat   Mild tenderness over spinous process and paravertebral musculature   Positive Discomfort with lumbar flexion, extension, lateral rotation and bending   Manual motor testing of the lower extremities is 5 out of 5 bilaterally   DTRs are intact but diminished of the knee, intact at the ankle  Negative Straight leg raise   No sensory deficits to light touch pedal pulses 2+   Full pain-free range of motion through the right and left hip joint       Hip Exam:  Right  No obvious deformity.   Flexion to 120 degrees and internal and external rotation mildly restricted with end range external rotation   Abduction to 45 degree and adduction to 30 degrees.   Negative TRINI test.   Negative Stinchfield test.   No flexion contracture.   Negative  greater trochanteric tenderness.   Negative LUIS test.   5/5 strength, normal skin appearance and palpable pulses distally. Sensibility normal.    Imaging: X-rays ordered and images interpreted today personally by me of bilateral hips and pelvis three views each   Patient has well-maintained joint spaces   No acute osseous abnormalities noted   Patient does have varus angles of both of his hips        Assessment: Right knee pain, unspecified chronicity  -     X-Ray Knee Complete 4 Or More Views Right; Future; Expected date: 04/21/2023    Lumbar radiculopathy         Plan:  This point feel the patient's symptoms are not originating from his knee.    He is having more of a lumbar issue with radicular symptoms down his legs.  Line he has seen Dr. Cody in the past and has had a recent MRI in December of 2022.    This point we will refer him back to Dr. Cody for evaluation for possible repeat lumbar epidural steroid injections as he has no acute disease in his hips and his knee does not seem to be the source of his pain at this time.    Patient will call our office if he is knee pain becomes an  issue          DISCLAIMER: This note may have been dictated using voice recognition software and may contain grammatical errors.     NOTE: Consult report sent to referring provider via [facility] EMR.

## 2023-05-18 ENCOUNTER — OFFICE VISIT (OUTPATIENT)
Dept: NEUROLOGY | Facility: CLINIC | Age: 63
End: 2023-05-18
Payer: COMMERCIAL

## 2023-05-18 VITALS
WEIGHT: 220 LBS | SYSTOLIC BLOOD PRESSURE: 138 MMHG | HEIGHT: 69 IN | DIASTOLIC BLOOD PRESSURE: 76 MMHG | BODY MASS INDEX: 32.58 KG/M2

## 2023-05-18 DIAGNOSIS — G47.33 OBSTRUCTIVE SLEEP APNEA: Primary | ICD-10-CM

## 2023-05-18 PROCEDURE — 1159F MED LIST DOCD IN RCRD: CPT | Mod: CPTII,S$GLB,, | Performed by: NURSE PRACTITIONER

## 2023-05-18 PROCEDURE — 3008F BODY MASS INDEX DOCD: CPT | Mod: CPTII,S$GLB,, | Performed by: NURSE PRACTITIONER

## 2023-05-18 PROCEDURE — 1159F PR MEDICATION LIST DOCUMENTED IN MEDICAL RECORD: ICD-10-PCS | Mod: CPTII,S$GLB,, | Performed by: NURSE PRACTITIONER

## 2023-05-18 PROCEDURE — 99999 PR PBB SHADOW E&M-EST. PATIENT-LVL III: ICD-10-PCS | Mod: PBBFAC,,, | Performed by: NURSE PRACTITIONER

## 2023-05-18 PROCEDURE — 99213 OFFICE O/P EST LOW 20 MIN: CPT | Mod: S$GLB,,, | Performed by: NURSE PRACTITIONER

## 2023-05-18 PROCEDURE — 3075F PR MOST RECENT SYSTOLIC BLOOD PRESS GE 130-139MM HG: ICD-10-PCS | Mod: CPTII,S$GLB,, | Performed by: NURSE PRACTITIONER

## 2023-05-18 PROCEDURE — 3078F DIAST BP <80 MM HG: CPT | Mod: CPTII,S$GLB,, | Performed by: NURSE PRACTITIONER

## 2023-05-18 PROCEDURE — 99999 PR PBB SHADOW E&M-EST. PATIENT-LVL III: CPT | Mod: PBBFAC,,, | Performed by: NURSE PRACTITIONER

## 2023-05-18 PROCEDURE — 3075F SYST BP GE 130 - 139MM HG: CPT | Mod: CPTII,S$GLB,, | Performed by: NURSE PRACTITIONER

## 2023-05-18 PROCEDURE — 3078F PR MOST RECENT DIASTOLIC BLOOD PRESSURE < 80 MM HG: ICD-10-PCS | Mod: CPTII,S$GLB,, | Performed by: NURSE PRACTITIONER

## 2023-05-18 PROCEDURE — 3008F PR BODY MASS INDEX (BMI) DOCUMENTED: ICD-10-PCS | Mod: CPTII,S$GLB,, | Performed by: NURSE PRACTITIONER

## 2023-05-18 PROCEDURE — 99213 PR OFFICE/OUTPT VISIT, EST, LEVL III, 20-29 MIN: ICD-10-PCS | Mod: S$GLB,,, | Performed by: NURSE PRACTITIONER

## 2023-05-18 RX ORDER — CLOPIDOGREL BISULFATE 75 MG/1
75 TABLET ORAL
COMMUNITY
Start: 2023-03-24

## 2023-05-18 RX ORDER — MELOXICAM 7.5 MG/1
1 TABLET ORAL DAILY PRN
COMMUNITY
Start: 2023-04-11

## 2023-05-18 NOTE — PROGRESS NOTES
"  Established VIOLET Patient   SUBJECTIVE:    Patient ID: Reynaldo Patton , 63 y.o.    Past Medical History:   Diagnosis Date    Coronary artery disease     Crohn's disease     GERD (gastroesophageal reflux disease)     Hypercholesterolemia     Hypertension        Past Surgical History:   Procedure Laterality Date    CHOLECYSTECTOMY      COLECTOMY      CORONARY STENT PLACEMENT  09/2015    [facility]    GALLBLADDER SURGERY      LEFT FRONTOTEMPORAL CRANIOTOMY FOR EXCISION FOR MENINGIOMA  06/24/2014    [facility]    LEFT FRONTOTEMPORAL RE-EXPLORATION & REMOVAL OF SCREW/PLATE  10/25/2016    [facility]       Review of patient's allergies indicates:   Allergen Reactions    Strawberry Anaphylaxis       Chief Complaint: VIOLET f/u    History of Present Illness:     3 mo VIOLET f/u.    Pt wears CPAP about 5x/wk. States he is still trying to get use to it. Sleeps better with CPAP. Sleeps 5-7 hrs a night and sleeps all night.     Awakens refreshed and denies EDS. Does not nap. Prior to CPAP, [place] was 11; Today [place] 4.    DME-HMS    "The machine has been life changing"    Review of Systems - as per HPI, otherwise a balanced 10 systems review is negative.      Current Medications:  Current Outpatient Medications   Medication Instructions    amLODIPine (NORVASC) 10 mg, Oral, Daily    carvediloL (COREG) 25 mg, Oral, 2 times daily    clopidogreL (PLAVIX) 75 mg, Oral    cyanocobalamin 1,000 mcg/mL injection INJECT 1 ML INTRAMUSCULARLY EVERY OTHER WEEK FOR 8 WEEKS THEN 1 ML EVERY MONTH    fluticasone propionate (FLONASE) 50 mcg/actuation nasal spray USE 1 SPRAY INTO EACH NOSTRIL TWICE DAILY AS NEEDED    furosemide (LASIX) 20 MG tablet TAKE 1 TABLET BY MOUTH DAILY ON MONDAY AND FRIDAY TAKE 2 TABLETS    KLOR-CON 10 10 mEq TbSR No dose, route, or frequency recorded.    LIDOcaine (LIDODERM) 5 % APPLY TO AFFECTED AREA ONCE DAILY FOR 12 HOURS MAXIMUM DURATION THEN REMOVE AND DISCARD PATCH    meloxicam (MOBIC) 7.5 MG tablet 1 tablet, Oral, Daily " "PRN    mupirocin (BACTROBAN) 2 % ointment APPLY ON THE SKIN TWICE A DAY CLEAN AREA BEFORE EACH APPLICATION WITH ANTIBACTERIAL SOAP    pantoprazole (PROTONIX) 40 mg, Oral, Daily    ranolazine (RANEXA) 500 mg, Oral    rosuvastatin (CRESTOR) 20 mg, Oral, Daily    tadalafiL (CIALIS) 10-20 mg, Oral, Daily    tiZANidine (ZANAFLEX) 8 mg, Oral, Nightly    VITAMIN B COMPLEX ORAL 1 tablet, Oral, Daily         OBJECTIVE:    Vitals:  /76   Ht 5' 9" (1.753 m)   Wt 99.8 kg (220 lb)   BMI 32.49 kg/m²      Physical Exam:  Constitutional  he appears well-developed and well-nourished. he is well groomed.    Accompanied by - self  Appearance - well appearing, no apparent distress, unassisted  Skin- no obvious lesions noted    Neurologic  Cortical function - The patient is alert, attentive, and oriented  Speech - clear   Cranial nerves:  CN 3, 4, 6 EOMs - normal. No ptosis or lateral gaze deviation  CN 7 - no face asymmetry; normal eye closure and smile  CN 8 - hearing is grossly normal  Motor - grossly normal  Gait - unassisted; posture upright. gait is steady with normal steps    Review of Data:      PAP Compliance Report  Last 41 days  Usage-93  Usage > 4 hrs - 39      EPWORTH SLEEPINESS SCALE 5/18/2023   Sitting and reading 0   Watching TV 0   Sitting, inactive in a public place (e.g. a theatre or a meeting) 0   As a passenger in a car for an hour without a break 0   Lying down to rest in the afternoon when circumstances permit 3   Sitting and talking to someone 0   Sitting quietly after a lunch without alcohol 1   In a car, while stopped for a few minutes in traffic 0   Total score 4            ASSESSMENT /PLAN:    Problem List Items Addressed This Visit          Other    Obstructive sleep apnea - Primary      - Continues to benefit from PAP therapy. Encouraged nightly use of PAP.   - Drowsy driving may still occur despite PAP use.   - F/u in 1 yr             Questions and concerns were sought and answered to the " patient's stated verbal satisfaction.    The patient verbalizes understanding and agreement with the above stated treatment plan.   Dr. Alfaro was available during today's encounter.     Items discussed include acute and/or chronic neurological, sleep, or other issues and their attendant differential diagnoses.  Potential for additional testing, treatment options, and prognosis also discussed.    __*_single dx ___multiple issues/ diagnoses  ___ low __* mod ___ high complexity of data  __*_low __mod ___ high risks     Medical Decision Making (MDM) used for CPT choice:  _*__low  ___moderate  ____high        RAF Shea  [facility]  [phone]

## 2024-01-03 ENCOUNTER — TELEPHONE (OUTPATIENT)
Dept: NEUROSURGERY | Facility: CLINIC | Age: 64
End: 2024-01-03
Payer: COMMERCIAL

## 2024-01-03 DIAGNOSIS — D32.0 BENIGN NEOPLASM OF CEREBRAL MENINGES: Primary | ICD-10-CM

## 2024-01-03 NOTE — TELEPHONE ENCOUNTER
Please have the patient obtain the previously recommended MRI of the brain w/ and w/o contrast. I have entered the order. If his symptoms worsen, I would recommend he present to the ED. I will notify him of his results once they become available. Thanks

## 2024-01-04 ENCOUNTER — LAB VISIT (OUTPATIENT)
Dept: LAB | Facility: HOSPITAL | Age: 64
End: 2024-01-04
Attending: INTERNAL MEDICINE
Payer: COMMERCIAL

## 2024-01-04 DIAGNOSIS — I10 HYPERTENSION, UNSPECIFIED TYPE: Primary | ICD-10-CM

## 2024-01-04 DIAGNOSIS — D32.0 BENIGN NEOPLASM OF CEREBRAL MENINGES: ICD-10-CM

## 2024-01-04 DIAGNOSIS — R35.1 NOCTURIA: ICD-10-CM

## 2024-01-04 DIAGNOSIS — Z79.899 ENCOUNTER FOR LONG-TERM (CURRENT) USE OF OTHER MEDICATIONS: ICD-10-CM

## 2024-01-04 DIAGNOSIS — E78.5 HYPERLIPIDEMIA, UNSPECIFIED HYPERLIPIDEMIA TYPE: ICD-10-CM

## 2024-01-04 LAB
ALBUMIN SERPL-MCNC: 3.9 G/DL (ref 3.4–4.8)
ALBUMIN/GLOB SERPL: 1.3 RATIO (ref 1.1–2)
ALP SERPL-CCNC: 87 UNIT/L (ref 40–150)
ALT SERPL-CCNC: 39 UNIT/L (ref 0–55)
AST SERPL-CCNC: 18 UNIT/L (ref 5–34)
BILIRUB SERPL-MCNC: 1.3 MG/DL
BUN SERPL-MCNC: 11.3 MG/DL (ref 8.4–25.7)
CALCIUM SERPL-MCNC: 8.8 MG/DL (ref 8.8–10)
CHLORIDE SERPL-SCNC: 105 MMOL/L (ref 98–107)
CHOLEST SERPL-MCNC: 188 MG/DL
CHOLEST/HDLC SERPL: 3 {RATIO} (ref 0–5)
CO2 SERPL-SCNC: 32 MMOL/L (ref 23–31)
CREAT SERPL-MCNC: 1.42 MG/DL (ref 0.73–1.18)
ERYTHROCYTE [DISTWIDTH] IN BLOOD BY AUTOMATED COUNT: 14.2 % (ref 11.5–17)
GFR SERPLBLD CREATININE-BSD FMLA CKD-EPI: 56 MLS/MIN/1.73/M2
GLOBULIN SER-MCNC: 3 GM/DL (ref 2.4–3.5)
GLUCOSE SERPL-MCNC: 94 MG/DL (ref 82–115)
HCT VFR BLD AUTO: 42.1 % (ref 42–52)
HDLC SERPL-MCNC: 57 MG/DL (ref 35–60)
HGB BLD-MCNC: 14.1 G/DL (ref 14–18)
LDLC SERPL CALC-MCNC: 102 MG/DL (ref 50–140)
MCH RBC QN AUTO: 26.8 PG (ref 27–31)
MCHC RBC AUTO-ENTMCNC: 33.5 G/DL (ref 33–36)
MCV RBC AUTO: 80 FL (ref 80–94)
NRBC BLD AUTO-RTO: 0 %
PLATELET # BLD AUTO: 185 X10(3)/MCL (ref 130–400)
PMV BLD AUTO: 9.2 FL (ref 7.4–10.4)
POTASSIUM SERPL-SCNC: 4.1 MMOL/L (ref 3.5–5.1)
PROT SERPL-MCNC: 6.9 GM/DL (ref 5.8–7.6)
PSA SERPL-MCNC: 3.01 NG/ML
RBC # BLD AUTO: 5.26 X10(6)/MCL (ref 4.7–6.1)
SODIUM SERPL-SCNC: 142 MMOL/L (ref 136–145)
TRIGL SERPL-MCNC: 144 MG/DL (ref 34–140)
VLDLC SERPL CALC-MCNC: 29 MG/DL
WBC # SPEC AUTO: 8.42 X10(3)/MCL (ref 4.5–11.5)

## 2024-01-04 PROCEDURE — 80053 COMPREHEN METABOLIC PANEL: CPT

## 2024-01-04 PROCEDURE — 80061 LIPID PANEL: CPT

## 2024-01-04 PROCEDURE — 84153 ASSAY OF PSA TOTAL: CPT

## 2024-01-04 PROCEDURE — 36415 COLL VENOUS BLD VENIPUNCTURE: CPT

## 2024-01-04 PROCEDURE — 85027 COMPLETE CBC AUTOMATED: CPT

## 2024-01-04 NOTE — TELEPHONE ENCOUNTER
I called the pt and informed him of Noreen's recommendation and he was very agreeable to this plan. He is sched at [facility] for his MRI on 1/11/24 @ 12:45pm. He knows to report to the ED if his symptoms worsen.

## 2024-01-11 ENCOUNTER — TELEPHONE (OUTPATIENT)
Dept: NEUROSURGERY | Facility: CLINIC | Age: 64
End: 2024-01-11
Payer: COMMERCIAL

## 2024-01-11 DIAGNOSIS — R51.9 CHRONIC RIGHT-SIDED HEADACHES: Primary | ICD-10-CM

## 2024-01-11 DIAGNOSIS — G89.29 CHRONIC RIGHT-SIDED HEADACHES: Primary | ICD-10-CM

## 2024-01-11 NOTE — TELEPHONE ENCOUNTER
Spoke with the patient regarding his MRI findings which reveal no acute issues and stable postoperative changes.  The patient reports persistent headaches since November which are typically right-sided.  He describes vision changes as well as nausea associated with these headaches.  He reports his eyes will often tear excessively as well. At this time I did encourage the patient to have his eyes examined although he states he has not established with a provider.  We will set up a referral for him to do so.  I also set up a referral for the patient to have a neurology consultation given the chronic nature of these headaches.  He was encouraged to utilize Tylenol and notify us should his symptoms progress prior to further consultation.  We will plan to see the patient back as scheduled in June.

## 2024-02-26 ENCOUNTER — OFFICE VISIT (OUTPATIENT)
Dept: OPHTHALMOLOGY | Facility: CLINIC | Age: 64
End: 2024-02-26
Payer: COMMERCIAL

## 2024-02-26 VITALS — BODY MASS INDEX: 32.58 KG/M2 | HEIGHT: 69 IN | WEIGHT: 220 LBS

## 2024-02-26 DIAGNOSIS — D32.9 MENINGIOMA: Primary | ICD-10-CM

## 2024-02-26 DIAGNOSIS — G89.29 CHRONIC RIGHT-SIDED HEADACHES: ICD-10-CM

## 2024-02-26 DIAGNOSIS — R51.9 CHRONIC RIGHT-SIDED HEADACHES: ICD-10-CM

## 2024-02-26 DIAGNOSIS — H04.129 DRY EYE: ICD-10-CM

## 2024-02-26 DIAGNOSIS — H25.13 AGE-RELATED NUCLEAR CATARACT OF BOTH EYES: ICD-10-CM

## 2024-02-26 PROCEDURE — 99215 OFFICE O/P EST HI 40 MIN: CPT | Mod: PBBFAC,PN | Performed by: STUDENT IN AN ORGANIZED HEALTH CARE EDUCATION/TRAINING PROGRAM

## 2024-02-26 PROCEDURE — 92083 EXTENDED VISUAL FIELD XM: CPT | Mod: PBBFAC,PN | Performed by: STUDENT IN AN ORGANIZED HEALTH CARE EDUCATION/TRAINING PROGRAM

## 2024-02-26 PROCEDURE — 92133 CPTRZD OPH DX IMG PST SGM ON: CPT | Mod: PBBFAC,PN | Performed by: STUDENT IN AN ORGANIZED HEALTH CARE EDUCATION/TRAINING PROGRAM

## 2024-02-26 PROCEDURE — 92133 CPTRZD OPH DX IMG PST SGM ON: CPT | Mod: PBBFAC,PN | Performed by: OPHTHALMOLOGY

## 2024-02-26 PROCEDURE — 92083 EXTENDED VISUAL FIELD XM: CPT | Mod: PBBFAC,PN | Performed by: OPHTHALMOLOGY

## 2024-02-26 RX ORDER — DEXTROMETHORPHAN HBR, PHENYLEPHRINE HCL, PYRILAMINE MALEATE 7.5; 5; 12.5 MG/5ML; MG/5ML; MG/5ML
SYRUP ORAL
COMMUNITY
Start: 2024-01-30

## 2024-02-26 RX ORDER — ENALAPRIL MALEATE 5 MG/1
TABLET ORAL
COMMUNITY

## 2024-02-26 RX ORDER — SILDENAFIL 100 MG/1
TABLET, FILM COATED ORAL
COMMUNITY

## 2024-02-26 RX ORDER — TADALAFIL 5 MG/1
5 TABLET ORAL
COMMUNITY
Start: 2024-01-18

## 2024-02-26 RX ORDER — MINOXIDIL 2.5 MG/1
TABLET ORAL
COMMUNITY

## 2024-02-26 RX ORDER — PHENIRAMINE MALEATE, PHENYLEPHRINE HCL 17.5; 1 MG/1; MG/1
1 TABLET ORAL EVERY 4 HOURS
COMMUNITY
Start: 2024-01-30

## 2024-02-26 RX ORDER — ROSUVASTATIN CALCIUM 40 MG/1
TABLET, COATED ORAL
COMMUNITY

## 2024-02-26 RX ORDER — ASPIRIN 81 MG/1
TABLET ORAL
COMMUNITY

## 2024-02-26 RX ORDER — ERGOCALCIFEROL 1.25 MG/1
CAPSULE ORAL
COMMUNITY

## 2024-02-26 RX ORDER — LOSARTAN POTASSIUM AND HYDROCHLOROTHIAZIDE 12.5; 1 MG/1; MG/1
TABLET ORAL
COMMUNITY

## 2024-02-26 RX ORDER — CALCIUM CARBONATE/VITAMIN D3 600MG-5MCG
TABLET ORAL
COMMUNITY

## 2024-02-26 RX ORDER — CIPROFLOXACIN 250 MG/1
28 TABLET, FILM COATED ORAL
COMMUNITY
Start: 2023-08-16

## 2024-02-26 RX ORDER — INFLIXIMAB-DYYB 100 MG/10ML
INJECTION, POWDER, LYOPHILIZED, FOR SOLUTION INTRAVENOUS
COMMUNITY

## 2024-02-26 RX ORDER — LISINOPRIL 40 MG/1
40 TABLET ORAL 2 TIMES DAILY
COMMUNITY

## 2024-02-26 RX ORDER — CICLOPIROX OLAMINE 7.7 MG/G
1 CREAM TOPICAL
COMMUNITY
Start: 2023-11-09 | End: 2024-05-07

## 2024-02-26 RX ORDER — ONDANSETRON 8 MG/1
40 TABLET, ORALLY DISINTEGRATING ORAL
COMMUNITY
Start: 2023-08-16

## 2024-02-26 RX ORDER — CARVEDILOL PHOSPHATE 80 MG/1
CAPSULE, EXTENDED RELEASE ORAL
COMMUNITY

## 2024-02-26 RX ORDER — METHOCARBAMOL 750 MG/1
TABLET, FILM COATED ORAL
COMMUNITY
Start: 2023-05-08

## 2024-02-26 RX ORDER — PREDNISONE 20 MG/1
20 TABLET ORAL
COMMUNITY

## 2024-02-26 RX ORDER — DOXYCYCLINE 100 MG/1
100 CAPSULE ORAL 2 TIMES DAILY
COMMUNITY
Start: 2024-01-30

## 2024-02-26 RX ORDER — ZOLEDRONIC ACID 5 MG/100ML
INJECTION, SOLUTION INTRAVENOUS
COMMUNITY

## 2024-02-26 NOTE — PROGRESS NOTES
Assessment /Plan     For exam results, see Encounter Report.    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  No evidence for mass, hemorrhage or infarction.Prior left frontotemporal craniotomy.  No residual or recurrent enhancing mass identified.  - VF 2/26/24: reliable and full ou, no signs visual changes from brain resection   - octn 2/26/24 all green ou    - PT with right sided headaches since November 2023 with nausea. Has tearing intermittently unrelated to headaches. Headaches started when went back to work from [employer]. Have been getting better recently without intervention. Now happening infrequently   - no signs of ocular pathology on exam, encourage to follow up with neurology for headaches   - discussed using +1.50 readers for computer work to see if helps reduce strain, +2.50 for reading  - seeing neurology 3/19/24  - will see yearly     3. NSC ou  - vision excellent without glasses ou     4. Ney  - likely cause of tearing  - start ats qid and       1 year dfe ou, sooner prn

## 2024-05-17 ENCOUNTER — APPOINTMENT (OUTPATIENT)
Dept: LAB | Facility: HOSPITAL | Age: 64
End: 2024-05-17
Attending: NURSE PRACTITIONER
Payer: COMMERCIAL

## 2024-05-17 DIAGNOSIS — Z79.899 ENCOUNTER FOR LONG-TERM (CURRENT) USE OF OTHER MEDICATIONS: Primary | ICD-10-CM

## 2024-05-17 DIAGNOSIS — E55.9 AVITAMINOSIS D: ICD-10-CM

## 2024-05-17 DIAGNOSIS — R79.9 ABNORMAL BLOOD CHEMISTRY: ICD-10-CM

## 2024-05-17 DIAGNOSIS — K21.9 GASTROESOPHAGEAL REFLUX DISEASE, UNSPECIFIED WHETHER ESOPHAGITIS PRESENT: ICD-10-CM

## 2024-05-17 DIAGNOSIS — K50.80 REGIONAL ENTERITIS OF SMALL INTESTINE WITH LARGE INTESTINE: ICD-10-CM

## 2024-05-17 LAB
25(OH)D3+25(OH)D2 SERPL-MCNC: 37 NG/ML (ref 30–80)
ALBUMIN SERPL-MCNC: 4.1 G/DL (ref 3.4–4.8)
ALBUMIN/GLOB SERPL: 1.4 RATIO (ref 1.1–2)
ALP SERPL-CCNC: 82 UNIT/L (ref 40–150)
ALT SERPL-CCNC: 38 UNIT/L (ref 0–55)
AST SERPL-CCNC: 21 UNIT/L (ref 5–34)
BASOPHILS # BLD AUTO: 0.05 X10(3)/MCL
BASOPHILS NFR BLD AUTO: 1.1 %
BILIRUB SERPL-MCNC: 1 MG/DL
BUN SERPL-MCNC: 15.8 MG/DL (ref 8.4–25.7)
CALCIUM SERPL-MCNC: 8.6 MG/DL (ref 8.8–10)
CHLORIDE SERPL-SCNC: 106 MMOL/L (ref 98–107)
CO2 SERPL-SCNC: 26 MMOL/L (ref 23–31)
CREAT SERPL-MCNC: 1.47 MG/DL (ref 0.73–1.18)
EOSINOPHIL # BLD AUTO: 0.07 X10(3)/MCL (ref 0–0.9)
EOSINOPHIL NFR BLD AUTO: 1.5 %
ERYTHROCYTE [DISTWIDTH] IN BLOOD BY AUTOMATED COUNT: 13.2 % (ref 11.5–17)
ERYTHROCYTE [SEDIMENTATION RATE] IN BLOOD: 3 MM/HR (ref 0–15)
GFR SERPLBLD CREATININE-BSD FMLA CKD-EPI: 53 ML/MIN/1.73/M2
GLOBULIN SER-MCNC: 2.9 GM/DL (ref 2.4–3.5)
GLUCOSE SERPL-MCNC: 102 MG/DL (ref 82–115)
HCT VFR BLD AUTO: 37.9 % (ref 42–52)
HGB BLD-MCNC: 12.8 G/DL (ref 14–18)
IMM GRANULOCYTES # BLD AUTO: 0.03 X10(3)/MCL (ref 0–0.04)
IMM GRANULOCYTES NFR BLD AUTO: 0.6 %
LYMPHOCYTES # BLD AUTO: 1.99 X10(3)/MCL (ref 0.6–4.6)
LYMPHOCYTES NFR BLD AUTO: 42 %
MCH RBC QN AUTO: 27.1 PG (ref 27–31)
MCHC RBC AUTO-ENTMCNC: 33.8 G/DL (ref 33–36)
MCV RBC AUTO: 80.1 FL (ref 80–94)
MONOCYTES # BLD AUTO: 0.44 X10(3)/MCL (ref 0.1–1.3)
MONOCYTES NFR BLD AUTO: 9.3 %
NEUTROPHILS # BLD AUTO: 2.16 X10(3)/MCL (ref 2.1–9.2)
NEUTROPHILS NFR BLD AUTO: 45.5 %
NRBC BLD AUTO-RTO: 0 %
PLATELET # BLD AUTO: 183 X10(3)/MCL (ref 130–400)
PMV BLD AUTO: 9.6 FL (ref 7.4–10.4)
POTASSIUM SERPL-SCNC: 3.7 MMOL/L (ref 3.5–5.1)
PROT SERPL-MCNC: 7 GM/DL (ref 5.8–7.6)
RBC # BLD AUTO: 4.73 X10(6)/MCL (ref 4.7–6.1)
SODIUM SERPL-SCNC: 142 MMOL/L (ref 136–145)
VIT B12 SERPL-MCNC: 981 PG/ML (ref 213–816)
WBC # SPEC AUTO: 4.74 X10(3)/MCL (ref 4.5–11.5)

## 2024-05-17 PROCEDURE — 80053 COMPREHEN METABOLIC PANEL: CPT

## 2024-05-17 PROCEDURE — 36415 COLL VENOUS BLD VENIPUNCTURE: CPT

## 2024-05-17 PROCEDURE — 82306 VITAMIN D 25 HYDROXY: CPT

## 2024-05-17 PROCEDURE — 82607 VITAMIN B-12: CPT

## 2024-05-17 PROCEDURE — 85025 COMPLETE CBC W/AUTO DIFF WBC: CPT

## 2024-05-17 PROCEDURE — 85652 RBC SED RATE AUTOMATED: CPT

## 2024-05-17 PROCEDURE — 86480 TB TEST CELL IMMUN MEASURE: CPT

## 2024-05-21 LAB
GAMMA INTERFERON BACKGROUND BLD IA-ACNC: 0.01 IU/ML
M TB IFN-G BLD-IMP: NEGATIVE
M TB IFN-G CD4+ BCKGRND COR BLD-ACNC: 0 IU/ML
M TB IFN-G CD4+CD8+ BCKGRND COR BLD-ACNC: 0 IU/ML
MITOGEN IGNF BCKGRD COR BLD-ACNC: >10 IU/ML

## 2024-05-31 ENCOUNTER — TELEPHONE (OUTPATIENT)
Dept: NEUROSURGERY | Facility: CLINIC | Age: 64
End: 2024-05-31
Payer: COMMERCIAL

## 2024-06-03 ENCOUNTER — TELEPHONE (OUTPATIENT)
Dept: NEUROSURGERY | Facility: CLINIC | Age: 64
End: 2024-06-03

## 2024-06-03 NOTE — TELEPHONE ENCOUNTER
I called pt in regards to his MRI that needed to be done before appt no answer. I also called alternate contact pt wife Lu no answer lvm.

## 2024-10-03 ENCOUNTER — HOSPITAL ENCOUNTER (OUTPATIENT)
Dept: RADIOLOGY | Facility: HOSPITAL | Age: 64
Discharge: HOME | End: 2024-10-03
Attending: NURSE PRACTITIONER
Payer: COMMERCIAL

## 2024-10-03 DIAGNOSIS — E55.9 AVITAMINOSIS D: ICD-10-CM

## 2024-10-03 DIAGNOSIS — K50.80 REGIONAL ENTERITIS OF SMALL INTESTINE WITH LARGE INTESTINE: ICD-10-CM

## 2024-10-03 DIAGNOSIS — K21.9 GASTROESOPHAGEAL REFLUX DISEASE, UNSPECIFIED WHETHER ESOPHAGITIS PRESENT: ICD-10-CM

## 2024-10-03 DIAGNOSIS — T47.4X5A SOAP COLITIS: ICD-10-CM

## 2024-10-03 DIAGNOSIS — K52.89 SOAP COLITIS: ICD-10-CM

## 2024-10-03 DIAGNOSIS — I10 ESSENTIAL HYPERTENSION, MALIGNANT: ICD-10-CM

## 2024-10-03 DIAGNOSIS — R10.31 ABDOMINAL PAIN, RIGHT LOWER QUADRANT: ICD-10-CM

## 2024-10-03 DIAGNOSIS — Z79.899 NEED FOR PROPHYLACTIC CHEMOTHERAPY: ICD-10-CM

## 2024-10-03 PROCEDURE — 74019 RADEX ABDOMEN 2 VIEWS: CPT | Mod: TC

## 2024-11-07 ENCOUNTER — LAB REQUISITION (OUTPATIENT)
Dept: LAB | Facility: HOSPITAL | Age: 64
End: 2024-11-07
Payer: COMMERCIAL

## 2024-11-07 DIAGNOSIS — Z79.899 OTHER LONG TERM (CURRENT) DRUG THERAPY: ICD-10-CM

## 2024-11-07 DIAGNOSIS — K21.9 GASTRO-ESOPHAGEAL REFLUX DISEASE WITHOUT ESOPHAGITIS: ICD-10-CM

## 2024-11-07 DIAGNOSIS — E55.9 VITAMIN D DEFICIENCY, UNSPECIFIED: ICD-10-CM

## 2024-11-07 DIAGNOSIS — I10 ESSENTIAL (PRIMARY) HYPERTENSION: ICD-10-CM

## 2024-11-07 DIAGNOSIS — R10.31 RIGHT LOWER QUADRANT PAIN: ICD-10-CM

## 2024-11-07 DIAGNOSIS — K52.89 OTHER SPECIFIED NONINFECTIVE GASTROENTERITIS AND COLITIS: ICD-10-CM

## 2024-11-07 DIAGNOSIS — K50.80 CROHN'S DISEASE OF BOTH SMALL AND LARGE INTESTINE WITHOUT COMPLICATIONS: ICD-10-CM

## 2024-11-07 PROCEDURE — 83993 ASSAY FOR CALPROTECTIN FECAL: CPT | Performed by: NURSE PRACTITIONER

## 2024-11-09 LAB — CALPROTECTIN STL-MCNT: <50 MCG/G

## 2025-02-16 ENCOUNTER — HOSPITAL ENCOUNTER (INPATIENT)
Facility: HOSPITAL | Age: 65
LOS: 1 days | Discharge: HOME | DRG: 387 | End: 2025-02-17
Attending: STUDENT IN AN ORGANIZED HEALTH CARE EDUCATION/TRAINING PROGRAM | Admitting: STUDENT IN AN ORGANIZED HEALTH CARE EDUCATION/TRAINING PROGRAM
Payer: COMMERCIAL

## 2025-02-16 DIAGNOSIS — R10.9 ABDOMINAL PAIN: ICD-10-CM

## 2025-02-16 DIAGNOSIS — K50.90 EXACERBATION OF CROHN'S DISEASE: ICD-10-CM

## 2025-02-16 LAB
ALBUMIN SERPL-MCNC: 4.4 G/DL (ref 3.4–4.8)
ALBUMIN/GLOB SERPL: 1.2 RATIO (ref 1.1–2)
ALP SERPL-CCNC: 85 UNIT/L (ref 40–150)
ALT SERPL-CCNC: 39 UNIT/L (ref 0–55)
ANION GAP SERPL CALC-SCNC: 14 MEQ/L
AST SERPL-CCNC: 34 UNIT/L (ref 5–34)
BACTERIA #/AREA URNS AUTO: ABNORMAL /HPF
BASOPHILS # BLD AUTO: 0.02 X10(3)/MCL
BASOPHILS NFR BLD AUTO: 0.2 %
BILIRUB SERPL-MCNC: 1 MG/DL
BILIRUB UR QL STRIP.AUTO: NEGATIVE
BUN SERPL-MCNC: 8.9 MG/DL (ref 8.4–25.7)
CALCIUM SERPL-MCNC: 9.1 MG/DL (ref 8.8–10)
CHLORIDE SERPL-SCNC: 104 MMOL/L (ref 98–107)
CLARITY UR: CLEAR
CO2 SERPL-SCNC: 26 MMOL/L (ref 23–31)
COLOR UR AUTO: COLORLESS
CREAT SERPL-MCNC: 1.34 MG/DL (ref 0.72–1.25)
CREAT/UREA NIT SERPL: 7
CRP SERPL-MCNC: 1.1 MG/L
EOSINOPHIL # BLD AUTO: 0.05 X10(3)/MCL (ref 0–0.9)
EOSINOPHIL NFR BLD AUTO: 0.6 %
ERYTHROCYTE [DISTWIDTH] IN BLOOD BY AUTOMATED COUNT: 14 % (ref 11.5–17)
ERYTHROCYTE [SEDIMENTATION RATE] IN BLOOD: 22 MM/HR (ref 0–20)
GFR SERPLBLD CREATININE-BSD FMLA CKD-EPI: 59 ML/MIN/1.73/M2
GLOBULIN SER-MCNC: 3.8 GM/DL (ref 2.4–3.5)
GLUCOSE SERPL-MCNC: 125 MG/DL (ref 82–115)
GLUCOSE UR QL STRIP: NORMAL
HCT VFR BLD AUTO: 46 % (ref 42–52)
HGB BLD-MCNC: 15.4 G/DL (ref 14–18)
HGB UR QL STRIP: NEGATIVE
IMM GRANULOCYTES # BLD AUTO: 0.03 X10(3)/MCL (ref 0–0.04)
IMM GRANULOCYTES NFR BLD AUTO: 0.3 %
KETONES UR QL STRIP: NEGATIVE
LACTATE SERPL-SCNC: 1.5 MMOL/L (ref 0.5–2.2)
LEUKOCYTE ESTERASE UR QL STRIP: NEGATIVE
LIPASE SERPL-CCNC: 24 U/L
LYMPHOCYTES # BLD AUTO: 1.79 X10(3)/MCL (ref 0.6–4.6)
LYMPHOCYTES NFR BLD AUTO: 20.6 %
MAGNESIUM SERPL-MCNC: 1.8 MG/DL (ref 1.6–2.6)
MCH RBC QN AUTO: 26.4 PG (ref 27–31)
MCHC RBC AUTO-ENTMCNC: 33.5 G/DL (ref 33–36)
MCV RBC AUTO: 78.9 FL (ref 80–94)
MONOCYTES # BLD AUTO: 0.51 X10(3)/MCL (ref 0.1–1.3)
MONOCYTES NFR BLD AUTO: 5.9 %
MUCOUS THREADS URNS QL MICRO: ABNORMAL /LPF
NEUTROPHILS # BLD AUTO: 6.29 X10(3)/MCL (ref 2.1–9.2)
NEUTROPHILS NFR BLD AUTO: 72.4 %
NITRITE UR QL STRIP: NEGATIVE
NRBC BLD AUTO-RTO: 0 %
PH UR STRIP: 7.5 [PH]
PLATELET # BLD AUTO: 243 X10(3)/MCL (ref 130–400)
PMV BLD AUTO: 9.4 FL (ref 7.4–10.4)
POTASSIUM SERPL-SCNC: 4.1 MMOL/L (ref 3.5–5.1)
PROT SERPL-MCNC: 8.2 GM/DL (ref 5.8–7.6)
PROT UR QL STRIP: ABNORMAL
RBC # BLD AUTO: 5.83 X10(6)/MCL (ref 4.7–6.1)
RBC #/AREA URNS AUTO: ABNORMAL /HPF
SODIUM SERPL-SCNC: 144 MMOL/L (ref 136–145)
SP GR UR STRIP.AUTO: 1.02 (ref 1–1.03)
SQUAMOUS #/AREA URNS LPF: ABNORMAL /HPF
TROPONIN I SERPL-MCNC: <0.01 NG/ML (ref 0–0.04)
UROBILINOGEN UR STRIP-ACNC: NORMAL
WBC # BLD AUTO: 8.69 X10(3)/MCL (ref 4.5–11.5)
WBC #/AREA URNS AUTO: ABNORMAL /HPF

## 2025-02-16 PROCEDURE — 63600175 PHARM REV CODE 636 W HCPCS: Performed by: STUDENT IN AN ORGANIZED HEALTH CARE EDUCATION/TRAINING PROGRAM

## 2025-02-16 PROCEDURE — 87040 BLOOD CULTURE FOR BACTERIA: CPT | Performed by: STUDENT IN AN ORGANIZED HEALTH CARE EDUCATION/TRAINING PROGRAM

## 2025-02-16 PROCEDURE — A9577 INJ MULTIHANCE: HCPCS | Performed by: STUDENT IN AN ORGANIZED HEALTH CARE EDUCATION/TRAINING PROGRAM

## 2025-02-16 PROCEDURE — 25500020 PHARM REV CODE 255: Performed by: STUDENT IN AN ORGANIZED HEALTH CARE EDUCATION/TRAINING PROGRAM

## 2025-02-16 PROCEDURE — 85025 COMPLETE CBC W/AUTO DIFF WBC: CPT | Performed by: EMERGENCY MEDICINE

## 2025-02-16 PROCEDURE — 25000003 PHARM REV CODE 250: Performed by: STUDENT IN AN ORGANIZED HEALTH CARE EDUCATION/TRAINING PROGRAM

## 2025-02-16 PROCEDURE — 93005 ELECTROCARDIOGRAM TRACING: CPT

## 2025-02-16 PROCEDURE — 80053 COMPREHEN METABOLIC PANEL: CPT | Performed by: EMERGENCY MEDICINE

## 2025-02-16 PROCEDURE — 83735 ASSAY OF MAGNESIUM: CPT | Performed by: STUDENT IN AN ORGANIZED HEALTH CARE EDUCATION/TRAINING PROGRAM

## 2025-02-16 PROCEDURE — 86140 C-REACTIVE PROTEIN: CPT | Performed by: STUDENT IN AN ORGANIZED HEALTH CARE EDUCATION/TRAINING PROGRAM

## 2025-02-16 PROCEDURE — 21400001 HC TELEMETRY ROOM

## 2025-02-16 PROCEDURE — 83690 ASSAY OF LIPASE: CPT | Performed by: STUDENT IN AN ORGANIZED HEALTH CARE EDUCATION/TRAINING PROGRAM

## 2025-02-16 PROCEDURE — 81001 URINALYSIS AUTO W/SCOPE: CPT | Performed by: EMERGENCY MEDICINE

## 2025-02-16 PROCEDURE — 83605 ASSAY OF LACTIC ACID: CPT | Performed by: STUDENT IN AN ORGANIZED HEALTH CARE EDUCATION/TRAINING PROGRAM

## 2025-02-16 PROCEDURE — 85652 RBC SED RATE AUTOMATED: CPT | Performed by: STUDENT IN AN ORGANIZED HEALTH CARE EDUCATION/TRAINING PROGRAM

## 2025-02-16 PROCEDURE — 84484 ASSAY OF TROPONIN QUANT: CPT | Performed by: STUDENT IN AN ORGANIZED HEALTH CARE EDUCATION/TRAINING PROGRAM

## 2025-02-16 PROCEDURE — A9698 NON-RAD CONTRAST MATERIALNOC: HCPCS | Performed by: STUDENT IN AN ORGANIZED HEALTH CARE EDUCATION/TRAINING PROGRAM

## 2025-02-16 RX ORDER — HYDROCODONE BITARTRATE AND ACETAMINOPHEN 10; 325 MG/1; MG/1
1 TABLET ORAL EVERY 6 HOURS PRN
Refills: 0 | Status: DISCONTINUED | OUTPATIENT
Start: 2025-02-16 | End: 2025-02-16

## 2025-02-16 RX ORDER — HYDROCODONE BITARTRATE AND ACETAMINOPHEN 500; 5 MG/1; MG/1
TABLET ORAL
Status: DISCONTINUED | OUTPATIENT
Start: 2025-02-16 | End: 2025-02-16

## 2025-02-16 RX ORDER — ENOXAPARIN SODIUM 100 MG/ML
40 INJECTION SUBCUTANEOUS EVERY 24 HOURS
Status: DISCONTINUED | OUTPATIENT
Start: 2025-02-16 | End: 2025-02-17 | Stop reason: HOSPADM

## 2025-02-16 RX ORDER — HYDRALAZINE HYDROCHLORIDE 20 MG/ML
10 INJECTION INTRAMUSCULAR; INTRAVENOUS
Status: COMPLETED | OUTPATIENT
Start: 2025-02-16 | End: 2025-02-16

## 2025-02-16 RX ORDER — RANOLAZINE 500 MG/1
500 TABLET, EXTENDED RELEASE ORAL 2 TIMES DAILY
Status: DISCONTINUED | OUTPATIENT
Start: 2025-02-16 | End: 2025-02-17 | Stop reason: HOSPADM

## 2025-02-16 RX ORDER — ONDANSETRON HYDROCHLORIDE 2 MG/ML
4 INJECTION, SOLUTION INTRAVENOUS
Status: COMPLETED | OUTPATIENT
Start: 2025-02-16 | End: 2025-02-16

## 2025-02-16 RX ORDER — SODIUM CHLORIDE 9 MG/ML
INJECTION, SOLUTION INTRAVENOUS CONTINUOUS
Status: DISCONTINUED | OUTPATIENT
Start: 2025-02-16 | End: 2025-02-17

## 2025-02-16 RX ORDER — HYDROMORPHONE HYDROCHLORIDE 2 MG/ML
0.5 INJECTION, SOLUTION INTRAMUSCULAR; INTRAVENOUS; SUBCUTANEOUS EVERY 6 HOURS PRN
Status: DISCONTINUED | OUTPATIENT
Start: 2025-02-16 | End: 2025-02-17 | Stop reason: HOSPADM

## 2025-02-16 RX ORDER — AMLODIPINE BESYLATE 5 MG/1
10 TABLET ORAL DAILY
Status: DISCONTINUED | OUTPATIENT
Start: 2025-02-16 | End: 2025-02-17 | Stop reason: HOSPADM

## 2025-02-16 RX ORDER — HYDROMORPHONE HYDROCHLORIDE 2 MG/ML
1 INJECTION, SOLUTION INTRAMUSCULAR; INTRAVENOUS; SUBCUTANEOUS
Refills: 0 | Status: COMPLETED | OUTPATIENT
Start: 2025-02-16 | End: 2025-02-16

## 2025-02-16 RX ORDER — ATORVASTATIN CALCIUM 40 MG/1
40 TABLET, FILM COATED ORAL DAILY
Status: DISCONTINUED | OUTPATIENT
Start: 2025-02-16 | End: 2025-02-17 | Stop reason: HOSPADM

## 2025-02-16 RX ORDER — CLOPIDOGREL BISULFATE 75 MG/1
75 TABLET ORAL DAILY
Status: DISCONTINUED | OUTPATIENT
Start: 2025-02-16 | End: 2025-02-17 | Stop reason: HOSPADM

## 2025-02-16 RX ORDER — MORPHINE SULFATE 4 MG/ML
4 INJECTION, SOLUTION INTRAMUSCULAR; INTRAVENOUS EVERY 4 HOURS PRN
Status: DISCONTINUED | OUTPATIENT
Start: 2025-02-16 | End: 2025-02-16

## 2025-02-16 RX ORDER — LISINOPRIL 20 MG/1
40 TABLET ORAL 2 TIMES DAILY
Status: DISCONTINUED | OUTPATIENT
Start: 2025-02-16 | End: 2025-02-17 | Stop reason: HOSPADM

## 2025-02-16 RX ORDER — PANTOPRAZOLE SODIUM 40 MG/1
40 TABLET, DELAYED RELEASE ORAL DAILY
Status: DISCONTINUED | OUTPATIENT
Start: 2025-02-16 | End: 2025-02-17 | Stop reason: HOSPADM

## 2025-02-16 RX ORDER — ONDANSETRON HYDROCHLORIDE 2 MG/ML
4 INJECTION, SOLUTION INTRAVENOUS EVERY 4 HOURS PRN
Status: DISCONTINUED | OUTPATIENT
Start: 2025-02-16 | End: 2025-02-17 | Stop reason: HOSPADM

## 2025-02-16 RX ORDER — TIZANIDINE 4 MG/1
8 TABLET ORAL NIGHTLY
Status: DISCONTINUED | OUTPATIENT
Start: 2025-02-16 | End: 2025-02-17 | Stop reason: HOSPADM

## 2025-02-16 RX ORDER — MORPHINE SULFATE 4 MG/ML
4 INJECTION, SOLUTION INTRAMUSCULAR; INTRAVENOUS
Refills: 0 | Status: COMPLETED | OUTPATIENT
Start: 2025-02-16 | End: 2025-02-16

## 2025-02-16 RX ORDER — CARVEDILOL 12.5 MG/1
25 TABLET ORAL 2 TIMES DAILY
Status: DISCONTINUED | OUTPATIENT
Start: 2025-02-16 | End: 2025-02-17 | Stop reason: HOSPADM

## 2025-02-16 RX ORDER — HYDROCODONE BITARTRATE AND ACETAMINOPHEN 10; 325 MG/1; MG/1
1 TABLET ORAL EVERY 4 HOURS PRN
Refills: 0 | Status: DISCONTINUED | OUTPATIENT
Start: 2025-02-16 | End: 2025-02-17 | Stop reason: HOSPADM

## 2025-02-16 RX ADMIN — HYDRALAZINE HYDROCHLORIDE 10 MG: 20 INJECTION INTRAMUSCULAR; INTRAVENOUS at 09:02

## 2025-02-16 RX ADMIN — PIPERACILLIN SODIUM AND TAZOBACTAM SODIUM 4.5 G: 4; .5 INJECTION, POWDER, LYOPHILIZED, FOR SOLUTION INTRAVENOUS at 09:02

## 2025-02-16 RX ADMIN — ATORVASTATIN CALCIUM 40 MG: 40 TABLET, FILM COATED ORAL at 11:02

## 2025-02-16 RX ADMIN — CARVEDILOL 25 MG: 12.5 TABLET, FILM COATED ORAL at 11:02

## 2025-02-16 RX ADMIN — BARIUM SULFATE 1350 ML: 1 SUSPENSION ORAL at 09:02

## 2025-02-16 RX ADMIN — ONDANSETRON 4 MG: 2 INJECTION INTRAMUSCULAR; INTRAVENOUS at 02:02

## 2025-02-16 RX ADMIN — GADOBENATE DIMEGLUMINE 18 ML: 529 INJECTION, SOLUTION INTRAVENOUS at 09:02

## 2025-02-16 RX ADMIN — CLOPIDOGREL BISULFATE 75 MG: 75 TABLET ORAL at 11:02

## 2025-02-16 RX ADMIN — HYDROMORPHONE HYDROCHLORIDE 1 MG: 2 INJECTION INTRAMUSCULAR; INTRAVENOUS; SUBCUTANEOUS at 07:02

## 2025-02-16 RX ADMIN — RANOLAZINE 500 MG: 500 TABLET, FILM COATED, EXTENDED RELEASE ORAL at 09:02

## 2025-02-16 RX ADMIN — RANOLAZINE 500 MG: 500 TABLET, FILM COATED, EXTENDED RELEASE ORAL at 11:02

## 2025-02-16 RX ADMIN — LISINOPRIL 40 MG: 20 TABLET ORAL at 09:02

## 2025-02-16 RX ADMIN — AMLODIPINE BESYLATE 10 MG: 5 TABLET ORAL at 11:02

## 2025-02-16 RX ADMIN — PIPERACILLIN SODIUM AND TAZOBACTAM SODIUM 4.5 G: 4; .5 INJECTION, POWDER, LYOPHILIZED, FOR SOLUTION INTRAVENOUS at 05:02

## 2025-02-16 RX ADMIN — HYDROMORPHONE HYDROCHLORIDE 0.5 MG: 2 INJECTION INTRAMUSCULAR; INTRAVENOUS; SUBCUTANEOUS at 05:02

## 2025-02-16 RX ADMIN — PANTOPRAZOLE SODIUM 40 MG: 40 TABLET, DELAYED RELEASE ORAL at 11:02

## 2025-02-16 RX ADMIN — CARVEDILOL 25 MG: 12.5 TABLET, FILM COATED ORAL at 09:02

## 2025-02-16 RX ADMIN — IOHEXOL 100 ML: 350 INJECTION, SOLUTION INTRAVENOUS at 07:02

## 2025-02-16 RX ADMIN — HYDROMORPHONE HYDROCHLORIDE 0.5 MG: 2 INJECTION INTRAMUSCULAR; INTRAVENOUS; SUBCUTANEOUS at 12:02

## 2025-02-16 RX ADMIN — ENOXAPARIN SODIUM 40 MG: 40 INJECTION SUBCUTANEOUS at 06:02

## 2025-02-16 RX ADMIN — ONDANSETRON 4 MG: 2 INJECTION INTRAMUSCULAR; INTRAVENOUS at 06:02

## 2025-02-16 RX ADMIN — MORPHINE SULFATE 4 MG: 4 INJECTION, SOLUTION INTRAMUSCULAR; INTRAVENOUS at 06:02

## 2025-02-16 RX ADMIN — TIZANIDINE 8 MG: 4 TABLET ORAL at 09:02

## 2025-02-16 RX ADMIN — SODIUM CHLORIDE: 9 INJECTION, SOLUTION INTRAVENOUS at 09:02

## 2025-02-16 RX ADMIN — SODIUM CHLORIDE, POTASSIUM CHLORIDE, SODIUM LACTATE AND CALCIUM CHLORIDE 1000 ML: 600; 310; 30; 20 INJECTION, SOLUTION INTRAVENOUS at 06:02

## 2025-02-16 NOTE — ED PROVIDER NOTES
Encounter Date: 2/16/2025    SCRIBE #1 NOTE: I, Maribell Guevara, am scribing for, and in the presence of,  Jermaine Tian MD. I have scribed the following portions of the note - Other sections scribed: HPI, ROS, PE.       History     Chief Complaint   Patient presents with    Abdominal Pain     Pt reports generalized abd pain with n/v beginning last night at 2000.  Pt had hx of SBO and Crohn's Disease.      Patient is a 65 year old male with a past medical history of CAD, Crohn's disease, GERD, and HTN presenting to the ED for evaluation of worsening generalized abdominal pain onset last night. Patient states he started feeling bad shortly after eating dinner. The pain started at 8-9, but he notes that it got significantly worse at midnight. It was localized to the right side, but is now diffuse. Patient states this feels similar to a previous bowel obstruction. He has had 2 colon resections in the past. Patient reports diarrhea. He denies urinary changes or flatulence. Per patient, his last colonoscopy was approximately 5 years ago.    The history is provided by the patient. No  was used.     Review of patient's allergies indicates:   Allergen Reactions    Strawberry Anaphylaxis     Past Medical History:   Diagnosis Date    Coronary artery disease     Crohn's disease     GERD (gastroesophageal reflux disease)     Hypercholesterolemia     Hypertension      Past Surgical History:   Procedure Laterality Date    CHOLECYSTECTOMY      COLECTOMY      CORONARY STENT PLACEMENT  09/2015    [facility]    GALLBLADDER SURGERY      LEFT FRONTOTEMPORAL CRANIOTOMY FOR EXCISION FOR MENINGIOMA  06/24/2014    [facility]    LEFT FRONTOTEMPORAL RE-EXPLORATION & REMOVAL OF SCREW/PLATE  10/25/2016    [facility]     Family History   Problem Relation Name Age of Onset    Cataracts Mother      Diabetes Mother      Heart disease Father       Social History[1]  Review of Systems   Constitutional:  Negative for chills and  fever.   HENT:  Negative for drooling and sore throat.    Eyes:  Negative for pain and redness.   Respiratory:  Negative for shortness of breath, wheezing and stridor.    Cardiovascular:  Negative for chest pain, palpitations and leg swelling.   Gastrointestinal:  Positive for abdominal pain and diarrhea. Negative for nausea and vomiting.   Genitourinary:  Negative for dysuria and hematuria.   Musculoskeletal:  Negative for back pain, neck pain and neck stiffness.   Skin:  Negative for rash and wound.   Neurological:  Negative for weakness and numbness.   Hematological:  Does not bruise/bleed easily.       Physical Exam     Initial Vitals [02/16/25 0628]   BP Pulse Resp Temp SpO2   (!) 196/84 64 (!) 99 96.3 °F (35.7 °C) 99 %      MAP       --         Physical Exam    Nursing note and vitals reviewed.  Constitutional: He appears well-developed.   Uncomfortable appearing   Eyes: EOM are normal. Pupils are equal, round, and reactive to light.   Cardiovascular:  Normal rate, regular rhythm and normal pulses.           No murmur heard.  Pulmonary/Chest: Breath sounds normal. No respiratory distress. He has no wheezes. He has no rales.   Abdominal: Abdomen is soft. He exhibits distension. There is generalized abdominal tenderness (worse to RLQ).   Multiple well-healed abdominal surgical scars      Skin: Skin is warm. Capillary refill takes less than 2 seconds. No rash noted.         ED Course   Procedures  Labs Reviewed   CBC W/ AUTO DIFFERENTIAL    Narrative:     The following orders were created for panel order CBC auto differential.  Procedure                               Abnormality         Status                     ---------                               -----------         ------                     CBC with Differential[[id number]]                                                        Please view results for these tests on the individual orders.   COMPREHENSIVE METABOLIC PANEL   URINALYSIS, REFLEX TO URINE  CULTURE   CBC WITH DIFFERENTIAL   LIPASE   LACTIC ACID, PLASMA   TROPONIN I   MAGNESIUM          Imaging Results    None          Medications   lactated ringers bolus 1,000 mL (1,000 mLs Intravenous New Bag 25 0646)   morphine injection 4 mg (4 mg Intravenous Given 25 0645)   ondansetron injection 4 mg (4 mg Intravenous Given 25 0645)     Medical Decision Making  Amount and/or Complexity of Data Reviewed  Labs:  Decision-making details documented in ED Course.  Radiology: ordered.    Risk  Prescription drug management.    Differential diagnosis (includes but is not limited to):   ***    MDM Narrative  ***    Update: ***    Dispo: ***    My independent radiology interpretation: ***  Point of care US (independently performed and interpreted): ***  Decision rules/clinical scoring: ***    Sepsis Perfusion Assessment: ***    Amount and/or Complexity of Data Reviewed  Independent historian: {MDMINDEPENDENTHISTORIAN:75657}   Summary of history: ***  External data reviewed: {MDMEXTERNALDATAREVIEWED:84248}  Summary of data reviewed: ***  Risk and benefits of testing: discussed   {MDMLABS:57596}  {MDMRAD:43687}  {MDMEC}  Discussion of management or test interpretation with external provider(s): {MDMEXTERNALPROVIDER:18055}   Summary of discussion: ***    Risk  {MDMRISK:06218}    Critical Care  {MDMCRITICALCARE:21888}    Data Reviewed/Counseling: I have personally reviewed the patient's vital signs, nursing notes, and other relevant tests, information, and imaging. I had a detailed discussion regarding the historical points, exam findings, and any diagnostic results supporting the discharge diagnosis. I personally performed the history, PE, MDM and procedures as documented above and agree with the scribe's documentation.    Portions of this note were dictated using voice recognition software. Although it was reviewed for accuracy, some inherent voice recognition errors may have occurred and may be present  in this document.          Scribe Attestation:   Scribe #1: I performed the above scribed service and the documentation accurately describes the services I performed. I attest to the accuracy of the note.    Attending Attestation:           Physician Attestation for Scribe:  Physician Attestation Statement for Scribe #1: I, Jermaine Tian MD, reviewed documentation, as scribed by Maribell Guevara in my presence, and it is both accurate and complete.             ED Course as of 02/16/25 0657   Sun Feb 16, 2025   0650 EKG independently interpreted by me.  EKG: SR @ 60, , Qtc 446 [MC]   0656 WBC: 8.69 [MC]   0656 Immature Grans (Abs): 0.03 [MC]      ED Course User Index  [MC] Jermaine Tian MD                           Clinical Impression:  Final diagnoses:  [R10.9] Abdominal pain                   [1]   Social History  Tobacco Use    Smoking status: Never    Smokeless tobacco: Never   Substance Use Topics    Alcohol use: Not Currently    Drug use: Never

## 2025-02-16 NOTE — H&P
[facility] Hospital Medicine History & Physical Examination       Patient Name: Reynaldo Patton  MRN: [id number]  Patient Class: IP- Inpatient   Admission Date: 2/16/2025   Admitting Physician: FELIPE Service   Length of Stay: 0  Attending Physician: Dr. Martinez  Primary Care Provider: Patsy Caicedo FNP  Face-to-Face encounter date: 02/16/2025  Code Status:Full  Chief Complaint: Abdominal Pain (Pt reports generalized abd pain with n/v beginning last night at 2000.  Pt had hx of SBO and Crohn's Disease. )      Screening for Social Drivers for health:  Patient screened for food insecurity, housing instability, transportation needs, utility difficulties, and interpersonal safety (select all that apply as identified as concern)  []Housing or Food  []Transportation Needs  []Utility Difficulties  []Interpersonal safety  [x]None      Patient information was obtained from patient, patient's family, past medical records and ER records.  ED records were reviewed in detail and documented below    HISTORY OF PRESENT ILLNESS:   the patient is a 65-year-old  male with a past medical history of CAD status post PCI on DA PT, VIOLET, meningioma, status post craniotomy, Crohn's disease, GERD, previous history of small-bowel obstruction, hyperlipidemia and hypertension who presented to the ED with abdominal pain, nausea and vomiting; imaging of abdomen revealed dilated bowel loops. The patient was seen and evaluated in bed ED #33.  PAST MEDICAL HISTORY:     Past Medical History:   Diagnosis Date    Coronary artery disease     Crohn's disease     GERD (gastroesophageal reflux disease)     Hypercholesterolemia     Hypertension        PAST SURGICAL HISTORY:     Past Surgical History:   Procedure Laterality Date    CHOLECYSTECTOMY      COLECTOMY      CORONARY STENT PLACEMENT  09/2015    [facility]    GALLBLADDER SURGERY      LEFT FRONTOTEMPORAL CRANIOTOMY FOR EXCISION FOR MENINGIOMA  06/24/2014     [facility]    LEFT FRONTOTEMPORAL RE-EXPLORATION & REMOVAL OF SCREW/PLATE  10/25/2016    [facility]       ALLERGIES:   [place]    FAMILY HISTORY:   Reviewed and negative    SOCIAL HISTORY:     Social History     Tobacco Use    Smoking status: Never    Smokeless tobacco: Never   Substance Use Topics    Alcohol use: Not Currently    , lives with his wife; no EtOH abuse, lifelong nonsmoker    HOME MEDICATIONS:     Prior to Admission medications    Medication Sig Start Date End Date Taking? Authorizing Provider   amLODIPine (NORVASC) 10 MG tablet Take 10 mg by mouth once daily. 2/26/22  Yes Provider, Historical   aspirin (ECOTRIN) 81 MG EC tablet 0   Yes Provider, Historical   calcium-vitamin D tablet 600 mg-200 units tablet   Yes Provider, Historical   carvediloL (COREG) 25 MG tablet Take 25 mg by mouth 2 (two) times daily. 4/18/22  Yes Provider, Historical   clopidogreL (PLAVIX) 75 mg tablet Take 75 mg by mouth. 3/24/23  Yes Provider, Historical   cyanocobalamin 1,000 mcg/mL injection INJECT 1 ML INTRAMUSCULARLY EVERY OTHER WEEK FOR 8 WEEKS THEN 1 ML EVERY MONTH 2/26/22  Yes Provider, Historical   furosemide (LASIX) 20 MG tablet TAKE 1 TABLET BY MOUTH DAILY ON MONDAY AND FRIDAY TAKE 2 TABLETS 3/22/22  Yes Provider, Historical   lisinopriL (PRINIVIL,ZESTRIL) 40 MG tablet Take 40 mg by mouth 2 (two) times daily.   Yes Provider, Historical   losartan-hydrochlorothiazide 100-12.5 mg (HYZAAR) 100-12.5 mg Tab tablet   Yes Provider, Historical   pantoprazole (PROTONIX) 40 MG tablet Take 40 mg by mouth once daily. 4/22/22  Yes Provider, Historical   rosuvastatin (CRESTOR) 20 MG tablet Take 20 mg by mouth once daily. 3/31/22  Yes Provider, Historical   tadalafiL (CIALIS) 5 MG tablet Take 5 mg by mouth. 1/18/24  Yes Provider, Historical   VITAMIN B COMPLEX ORAL Take 1 tablet by mouth once daily.   Yes Provider, Historical   ALAHIST D 10-17.5 mg Tab Take 1 tablet by mouth every 4 (four) hours. 1/30/24   Provider, Historical    carvedilol (COREG CR) 80 MG 24 hr capsule capsule    Provider, Historical   ciclopirox (LOPROX) 0.77 % Crea Apply 1 Application topically. 11/9/23 5/7/24  Provider, Historical   ciprofloxacin HCl (CIPRO) 250 MG tablet 28 tablets. 8/16/23   Provider, Historical   doxycycline (VIBRAMYCIN) 100 MG Cap Take 100 mg by mouth 2 (two) times daily. 1/30/24   Provider, Historical   enalapril (VASOTEC) 5 MG tablet 60    Provider, Historical   ergocalciferol (ERGOCALCIFEROL) 50,000 unit Cap 24 Capsule    Provider, Historical   fluticasone propionate (FLONASE) 50 mcg/actuation nasal spray USE 1 SPRAY INTO EACH NOSTRIL TWICE DAILY AS NEEDED 12/22/21   Provider, Historical   inFLIXimab-dyyb (INFLECTRA) 100 mg injection 0    Provider, Historical   KLOR-CON 10 10 mEq TbSR  5/14/22   Provider, Historical   LIDOcaine (LIDODERM) 5 % APPLY TO AFFECTED AREA ONCE DAILY FOR 12 HOURS MAXIMUM DURATION THEN REMOVE AND DISCARD PATCH 5/16/22   Provider, Historical   meloxicam (MOBIC) 7.5 MG tablet Take 1 tablet by mouth daily as needed. 4/11/23   Provider, Historical   methocarbamoL (ROBAXIN) 750 MG Tab TAKE 1 TABLET BY MOUTH THREE TIMES A DAY AS NEEDED FOR SPASMS 5/8/23   Provider, Historical   minoxidiL (LONITEN) 2.5 MG tablet 60    Provider, Historical   mupirocin (BACTROBAN) 2 % ointment APPLY ON THE SKIN TWICE A DAY CLEAN AREA BEFORE EACH APPLICATION WITH ANTIBACTERIAL SOAP 12/22/21   Provider, Historical   ondansetron (ZOFRAN-ODT) 8 MG TbDL 40 tablets. 8/16/23   Provider, Historical   POLYTUSSIN DM,PYRILAMINE, 12.5-5-7.5 mg/5 mL Liqd SMARTSIG:10 Milliliter(s) By Mouth Every 4-6 Hours 1/30/24   Provider, Historical   predniSONE (DELTASONE) 20 MG tablet Take 20 mg by mouth.    Provider, Historical   ranolazine (RANEXA) 500 MG Tb12 Take 500 mg by mouth.    Provider, Historical   rosuvastatin (CRESTOR) 40 MG Tab tablet    Provider, Historical   sildenafiL (VIAGRA) 100 MG tablet 5    Provider, Historical   tadalafiL (CIALIS) 20 MG Tab Take  10-20 mg by mouth once daily. 5/16/22   Provider, Historical   tiZANidine (ZANAFLEX) 4 MG tablet Take 8 mg by mouth nightly. 4/23/22   Provider, Historical   zoledronic acid-mannitol & water (RECLAST) 5 mg/100 mL PgBk 5 mg intravenously    Provider, Historical       REVIEW OF SYSTEMS:   Except as documented, all other systems reviewed and negative     PHYSICAL EXAM:     VITAL SIGNS: 24 HRS MIN & MAX LAST   Temp  Min: 96.3 °F (35.7 °C)  Max: 98.1 °F (36.7 °C) 98.1 °F (36.7 °C)   BP  Min: 124/72  Max: 218/114 124/72   Pulse  Min: 52  Max: 75  (!) 54   Resp  Min: 10  Max: 26 18   SpO2  Min: 95 %  Max: 100 % 97 %     General appearance: Well-developed, well-nourished AA male lying on ED stretcher, in no apparent distress.  HENT: Atraumatic head. Moist mucous membranes of oral cavity.  Eyes: Normal extraocular movements.   Neck: Supple.   Lungs: Clear to auscultation bilaterally. No wheezing present.   Heart: Regular rate and rhythm. S1 and S2 present with no murmurs/gallop/rub. No pedal edema. No JVD present.   Abdomen: Soft, moderately distended, non-tender. No rebound tenderness/guarding. Bowel sounds are active   Extremities: No cyanosis, clubbing, or edema.  Skin: No Rash.   Neuro: Motor and sensory exams grossly intact. Good tone. Muscle strength 5/5 in all 4 extremities  Psych/mental status: Appropriate mood and affect. Responds appropriately to questions.     LABS AND IMAGING:     Recent Labs   Lab 02/16/25  0649   WBC 8.69   RBC 5.83   HGB 15.4   HCT 46.0   MCV 78.9*   MCH 26.4*   MCHC 33.5   RDW 14.0      MPV 9.4       Recent Labs   Lab 02/16/25  0649      K 4.1      CO2 26   BUN 8.9   CREATININE 1.34*   CALCIUM 9.1   MG 1.80   ALBUMIN 4.4   ALKPHOS 85   ALT 39   AST 34   BILITOT 1.0       Microbiology Results (last 7 days)       Procedure Component Value Units Date/Time    Blood Culture #1 **CANNOT BE ORDERED STAT** [[id number]] Collected: 02/16/25 0910    Order Status: Resulted Specimen:  Blood Updated: 02/16/25 0925    Blood Culture #1 **CANNOT BE ORDERED STAT** [[id number]] Collected: 02/16/25 0906    Order Status: Resulted Specimen: Blood Updated: 02/16/25 0919    Clostridium Diff Toxin, A & B, EIA [[id number]]     Order Status: Sent Specimen: Stool     Stool Culture **CANNOT BE ORDERED STAT** [[id number]]     Order Status: Sent Specimen: Stool              CT Abdomen Pelvis With IV Contrast NO Oral Contrast  Narrative: EXAMINATION:  CT ABDOMEN PELVIS WITH IV CONTRAST    CLINICAL HISTORY:  Abdominal pain, post-op;    TECHNIQUE:  Low dose axial images, sagittal and coronal reformations were obtained from the lung bases to the pubic symphysis following the IV administration of contrast. Automatic exposure control (AEC) is utilized to reduce patient radiation exposure.    COMPARISON:  03/01/2018    FINDINGS:  Lines and Tubes: None.    Thorax:Lungs: There is mild nonspecific dependent change at the lung bases. No focal infiltrate or consolidation is seen.    Pleura: No effusions or thickening.    Heart: The heart size is within normal limits.    Abdomen:Abdominal Wall: No abdominal wall pathology is seen.    Liver: Moderate fatty infiltration of the liver is present.    Biliary System: No extrahepatic biliary duct dilatation is seen.    Gallbladder: Surgical clips are seen in the gallbladder fossa consistent with prior cholecystectomy.    Pancreas: The pancreas appears unremarkable.    Spleen: The spleen appears unremarkable.    Adrenals: There is stable prominence of the left adrenal gland. This may represent adrenal hyperplasia.    Kidneys: The kidneys appear unremarkable with no stones cysts masses or hydronephrosis.    Aorta: There is minimal calcification of the abdominal aorta and its branches.    IVC: Unremarkable    Bowel: Post surgical change is seen with a suture line in the right upper quadrant.    Esophagus: The visualized esophagus appears unremarkable.    Stomach: The stomach appears  unremarkable.    Duodenum: Unremarkable appearing duodenum.    Small Bowel: There are numerous fluid filled small bowel loops which are moderately distended with no definite transition point identified. There is pronounced wall thickening with mucosal enhancement of multiple loops of small bowel in the right hemiabdomen with moderate surrounding free fluid and moderate mesenteric fat stranding. This suggests an element of enteritis    Colon: Nondistended.    Appendix: No appendix is identified.    Peritoneum: No free intraperitoneal air is seen.    Pelvis    Bladder: The bladder is nondistended but appears otherwise unremarkable.    Male:Prostate gland: The prostate gland appears unremarkable    Bony structures:Dorsal Spine: There is mild spondylosis of the visualized dorsal spine.    Bony Pelvis: There is mild degenerative change of the bilateral hips.  Impression: 1. There are numerous fluid filled small bowel loops which are moderately distended with no definite transition point identified. There is pronounced wall thickening with mucosal enhancement of multiple loops of small bowel in the right hemiabdomen with moderate surrounding free fluid and moderate mesenteric fat stranding. This suggests an element of enteritis. Correlate with clinical and laboratory parameters as regards further evaluation and follow up.    2. Details and other findings as discussed above.    There is concurrence with the preliminary report above with 1 additional finding of some perihepatic fluid.  This is likely reactive.  Short-term follow-up is recommended of the enteritis as this may represent exacerbation of the patient's Crohn's disease.    This report was flagged in Epic as abnormal.    Electronically signed by: Lewis Javier  Date:    02/16/2025  Time:    08:17      ASSESSMENT & PLAN:   Impression:  Enteritis vs SBO per imaging of abdomen  Hx Crohn's disease on biologic therapy  CAD, S/P PCI on  DAPT  Hypertension  Hyperlipidemia    Plan:  Consult GI  Bowel rest  NGT only if nausea and vomiting persists  Pain control      VTE Prophylaxis: will be placed on Heparin/Lovenox/ Xarelto/ SCD for DVT prophylaxis and will be advised to be as mobile as possible and sit in a chair as tolerated    Patient condition:  Stable/Fair/Guarded/ Serious/ Critical    __________________________________________________________________________  INPATIENT LIST OF MEDICATIONS     Scheduled Meds:   amLODIPine  10 mg Oral Daily    atorvastatin  40 mg Oral Daily    carvediloL  25 mg Oral BID    clopidogreL  75 mg Oral Daily    pantoprazole  40 mg Oral Daily    piperacillin-tazobactam (Zosyn) IV (PEDS and ADULTS) (extended infusion is not appropriate)  4.5 g Intravenous ED 1 Time    ranolazine  500 mg Oral BID    tiZANidine  8 mg Oral Nightly     Continuous Infusions:  PRN Meds:.  Current Facility-Administered Medications:     HYDROcodone-acetaminophen, 1 tablet, Oral, Q4H PRN    HYDROmorphone, 0.5 mg, Intravenous, Q6H PRN      IKasey NP have reviewed and discussed the case with Dr. Hernández.  Please see the following addendum for further assessment and plan from there attending MD.    02/16/2025    ________________________________________________________________________________    MD Addendum:  IDr. ---assumed care of this patient today at ---am/pm  For the patient encounter, I performed the substantive portion of the visit, I reviewed the NP/PA documentation, treatment plan, and medical decision making.  I had face to face time with this patient     A. History:    B. Physical exam:    C. Medical decision making:    Discharge Planning and Disposition: No mobility needs. Ambulating well. Good social support system.   Anticipated discharge    If patient was admitted under observational status it is with my approval/permission.        All diagnosis and differential diagnosis have been reviewed; assessment and plan has  been documented; I have personally reviewed the labs and test results that are presently available; I have reviewed the patients medication list; I have reviewed the consulting providers response and recommendations. I have reviewed or attempted to review medical records based upon their availability.    All of the patient and family questions have been addressed and answered. Patient's is agreeable to the above stated plan. I will continue to monitor closely and make adjustments to medical management as needed.    If patient was admitted under observational status it is with my approval/permission.      Kasey Bender, ALISSA   02/16/2025

## 2025-02-16 NOTE — CONSULTS
Acute Care Surgery   Consult    Patient Name: Reynaldo Patton  YOB: 1960  Date: 02/16/2025 11:22 AM  Date of Admission: 2/16/2025  HD#0  POD#* No surgery found *    PRESENTING HISTORY   Chief Complaint/Reason for Admission: <principal problem not specified>  History source(s): patient  History of Present Illness:  Reynaldo Patton is a 65 y.o. male with PMHx of Crohn's s/p colon rxn x2 on Infliximab, CAD s/p PCI on DAPT, SBO, VIOLET, HTN, meningioma s/p crani, s/p lap tish who presents with abdominal pain and distention with nausea and 1 episode of vomiting this morning that all began approximately 12 hours ago.  The pain was initially on his left side and became generalized bloating.  He vomited once this morning, prompting him to come to the emergency room.  This feels similar to prior SBO approximately 5 years ago.  Last oral intake was yesterday evening.  Last bowel movement was yesterday morning and was normal.  He has not had bowel function aside from a small amount of liquid stool.  He has had some sweats but no measured fevers.  Last bowel obstruction was resolved conservatively during admission with bowel rest.  Last infliximab infusion was approximately 5 weeks ago, he gets them every 8 weeks.  Last colon surgery was over 10 years ago.  He reports that during both colon resections, there was a small amount of adherent bowel that was also resected.  No blood in vomit or stool.      Review of Systems:  12 point ROS negative except as stated in HPI    PAST HISTORY:   Past medical history:  Past Medical History:   Diagnosis Date    Coronary artery disease     Crohn's disease     GERD (gastroesophageal reflux disease)     Hypercholesterolemia     Hypertension        Past surgical history:  Past Surgical History:   Procedure Laterality Date    CHOLECYSTECTOMY      COLECTOMY      CORONARY STENT PLACEMENT  09/2015    [facility]    GALLBLADDER SURGERY      LEFT FRONTOTEMPORAL CRANIOTOMY FOR  EXCISION FOR MENINGIOMA  06/24/2014    [facility]    LEFT FRONTOTEMPORAL RE-EXPLORATION & REMOVAL OF SCREW/PLATE  10/25/2016    [facility]       Family history:  Family History   Problem Relation Name Age of Onset    Cataracts Mother      Diabetes Mother      Heart disease Father         Social history:  Social History     Socioeconomic History    Marital status:    Tobacco Use    Smoking status: Never    Smokeless tobacco: Never   Substance and Sexual Activity    Alcohol use: Not Currently    Drug use: Never     Tobacco Use History[1]   Social History     Substance and Sexual Activity   Alcohol Use Not Currently        MEDICATIONS & ALLERGIES:     No current facility-administered medications on file prior to encounter.     Current Outpatient Medications on File Prior to Encounter   Medication Sig    amLODIPine (NORVASC) 10 MG tablet Take 10 mg by mouth once daily.    aspirin (ECOTRIN) 81 MG EC tablet 0    calcium-vitamin D tablet 600 mg-200 units tablet    carvediloL (COREG) 25 MG tablet Take 25 mg by mouth 2 (two) times daily.    clopidogreL (PLAVIX) 75 mg tablet Take 75 mg by mouth.    cyanocobalamin 1,000 mcg/mL injection INJECT 1 ML INTRAMUSCULARLY EVERY OTHER WEEK FOR 8 WEEKS THEN 1 ML EVERY MONTH    furosemide (LASIX) 20 MG tablet TAKE 1 TABLET BY MOUTH DAILY ON MONDAY AND FRIDAY TAKE 2 TABLETS    lisinopriL (PRINIVIL,ZESTRIL) 40 MG tablet Take 40 mg by mouth 2 (two) times daily.    losartan-hydrochlorothiazide 100-12.5 mg (HYZAAR) 100-12.5 mg Tab tablet    pantoprazole (PROTONIX) 40 MG tablet Take 40 mg by mouth once daily.    rosuvastatin (CRESTOR) 20 MG tablet Take 20 mg by mouth once daily.    tadalafiL (CIALIS) 5 MG tablet Take 5 mg by mouth.    VITAMIN B COMPLEX ORAL Take 1 tablet by mouth once daily.    ALAHIST D 10-17.5 mg Tab Take 1 tablet by mouth every 4 (four) hours.    carvedilol (COREG CR) 80 MG 24 hr capsule capsule    ciclopirox (LOPROX) 0.77 % Crea Apply 1 Application topically.     "ciprofloxacin HCl (CIPRO) 250 MG tablet 28 tablets.    doxycycline (VIBRAMYCIN) 100 MG Cap Take 100 mg by mouth 2 (two) times daily.    enalapril (VASOTEC) 5 MG tablet 60    ergocalciferol (ERGOCALCIFEROL) 50,000 unit Cap 24 Capsule    fluticasone propionate (FLONASE) 50 mcg/actuation nasal spray USE 1 SPRAY INTO EACH NOSTRIL TWICE DAILY AS NEEDED    inFLIXimab-dyyb (INFLECTRA) 100 mg injection 0    KLOR-CON 10 10 mEq TbSR     LIDOcaine (LIDODERM) 5 % APPLY TO AFFECTED AREA ONCE DAILY FOR 12 HOURS MAXIMUM DURATION THEN REMOVE AND DISCARD PATCH    meloxicam (MOBIC) 7.5 MG tablet Take 1 tablet by mouth daily as needed.    methocarbamoL (ROBAXIN) 750 MG Tab TAKE 1 TABLET BY MOUTH THREE TIMES A DAY AS NEEDED FOR SPASMS    minoxidiL (LONITEN) 2.5 MG tablet 60    mupirocin (BACTROBAN) 2 % ointment APPLY ON THE SKIN TWICE A DAY CLEAN AREA BEFORE EACH APPLICATION WITH ANTIBACTERIAL SOAP    ondansetron (ZOFRAN-ODT) 8 MG TbDL 40 tablets.    POLYTUSSIN DM,PYRILAMINE, 12.5-5-7.5 mg/5 mL Liqd SMARTSIG:10 Milliliter(s) By Mouth Every 4-6 Hours    predniSONE (DELTASONE) 20 MG tablet Take 20 mg by mouth.    ranolazine (RANEXA) 500 MG Tb12 Take 500 mg by mouth.    rosuvastatin (CRESTOR) 40 MG Tab tablet    sildenafiL (VIAGRA) 100 MG tablet 5    tadalafiL (CIALIS) 20 MG Tab Take 10-20 mg by mouth once daily.    tiZANidine (ZANAFLEX) 4 MG tablet Take 8 mg by mouth nightly.    zoledronic acid-mannitol & water (RECLAST) 5 mg/100 mL PgBk 5 mg intravenously     Allergies:   Review of patient's allergies indicates:   Allergen Reactions    Strawberry Anaphylaxis     Scheduled Meds:  Continuous Infusions:  PRN Meds:    OBJECTIVE:   Vital Signs:  VITAL SIGNS: 24 HR MIN & MAX LAST   Temp  Min: 96.3 °F (35.7 °C)  Max: 98.1 °F (36.7 °C)  98.1 °F (36.7 °C)   BP  Min: 124/72  Max: 218/114  124/72    Pulse  Min: 52  Max: 75  (!) 54    Resp  Min: 10  Max: 26  (!) 21    SpO2  Min: 95 %  Max: 100 %  97 %      HT: 5' 9" (175.3 cm)  WT: 99.8 kg (220 lb) " " BMI: 32.5     Intake/output:  Intake/Output - Last 3 Shifts       None          No intake or output data in the 24 hours ending 02/16/25 1122      Physical Exam:  General: Well developed, well nourished, no acute distress  HEENT: Normocephalic, PERRL  CV: RR  Resp: NWOB  GI: soft, distended, mild tenderness in RLQ, no guarding or rebound. Midline incisional scar. No hernias.   MSK: No muscle atrophy, cyanosis, peripheral edema, moving all extremities spontaneously  Neuro:  CNII-XII grossly intact, alert and oriented to person, place, and time    Labs:  Troponin:  Recent Labs     02/16/25  0649   TROPONINI <0.010     CBC:  Recent Labs     02/16/25  0649   WBC 8.69   RBC 5.83   HGB 15.4   HCT 46.0      MCV 78.9*   MCH 26.4*   MCHC 33.5     CMP:  Recent Labs     02/16/25  0649   CALCIUM 9.1   ALBUMIN 4.4      K 4.1   CO2 26      BUN 8.9   CREATININE 1.34*   ALKPHOS 85   ALT 39   AST 34   BILITOT 1.0     Lactic Acid:  Recent Labs     02/16/25  0648   LACTATE 1.5     ETOH:  No results for input(s): "ETHANOL" in the last 72 hours.   Urine Drug Screen:  No results for input(s): "COCAINE", "OPIATE", "BARBITURATE", "AMPHETAMINE", "FENTANYL", "CANNABINOIDS", "MDMA" in the last 72 hours.    Invalid input(s): "BENZODIAZEPINE", "PHENCYCLIDINE"   ABG:  No results for input(s): "PH", "PO2", "PCO2", "HCO3", "BE" in the last 168 hours.   I have reviewed all pertinent lab results within the past 24 hours.    Diagnostic Results:  Imaging Results               CT Abdomen Pelvis With IV Contrast NO Oral Contrast (Final result)  Result time 02/16/25 08:17:33      Final result by Tamara Javier MD (02/16/25 08:17:33)                   Impression:      1. There are numerous fluid filled small bowel loops which are moderately distended with no definite transition point identified. There is pronounced wall thickening with mucosal enhancement of multiple loops of small bowel in the right hemiabdomen with moderate " surrounding free fluid and moderate mesenteric fat stranding. This suggests an element of enteritis. Correlate with clinical and laboratory parameters as regards further evaluation and follow up.    2. Details and other findings as discussed above.    There is concurrence with the preliminary report above with 1 additional finding of some perihepatic fluid.  This is likely reactive.  Short-term follow-up is recommended of the enteritis as this may represent exacerbation of the patient's Crohn's disease.    This report was flagged in Epic as abnormal.      Electronically signed by: eLwis Javier  Date:    02/16/2025  Time:    08:17               Narrative:    EXAMINATION:  CT ABDOMEN PELVIS WITH IV CONTRAST    CLINICAL HISTORY:  Abdominal pain, post-op;    TECHNIQUE:  Low dose axial images, sagittal and coronal reformations were obtained from the lung bases to the pubic symphysis following the IV administration of contrast. Automatic exposure control (AEC) is utilized to reduce patient radiation exposure.    COMPARISON:  03/01/2018    FINDINGS:  Lines and Tubes: None.    Thorax:Lungs: There is mild nonspecific dependent change at the lung bases. No focal infiltrate or consolidation is seen.    Pleura: No effusions or thickening.    Heart: The heart size is within normal limits.    Abdomen:Abdominal Wall: No abdominal wall pathology is seen.    Liver: Moderate fatty infiltration of the liver is present.    Biliary System: No extrahepatic biliary duct dilatation is seen.    Gallbladder: Surgical clips are seen in the gallbladder fossa consistent with prior cholecystectomy.    Pancreas: The pancreas appears unremarkable.    Spleen: The spleen appears unremarkable.    Adrenals: There is stable prominence of the left adrenal gland. This may represent adrenal hyperplasia.    Kidneys: The kidneys appear unremarkable with no stones cysts masses or hydronephrosis.    Aorta: There is minimal calcification of the abdominal  aorta and its branches.    IVC: Unremarkable    Bowel: Post surgical change is seen with a suture line in the right upper quadrant.    Esophagus: The visualized esophagus appears unremarkable.    Stomach: The stomach appears unremarkable.    Duodenum: Unremarkable appearing duodenum.    Small Bowel: There are numerous fluid filled small bowel loops which are moderately distended with no definite transition point identified. There is pronounced wall thickening with mucosal enhancement of multiple loops of small bowel in the right hemiabdomen with moderate surrounding free fluid and moderate mesenteric fat stranding. This suggests an element of enteritis    Colon: Nondistended.    Appendix: No appendix is identified.    Peritoneum: No free intraperitoneal air is seen.    Pelvis    Bladder: The bladder is nondistended but appears otherwise unremarkable.    Male:Prostate gland: The prostate gland appears unremarkable    Bony structures:Dorsal Spine: There is mild spondylosis of the visualized dorsal spine.    Bony Pelvis: There is mild degenerative change of the bilateral hips.                        Preliminary result by Christophe Lombardi MD (02/16/25 07:56:54)                   Impression:    1. There are numerous fluid filled small bowel loops which are moderately distended with no definite transition point identified. There is pronounced wall thickening with mucosal enhancement of multiple loops of small bowel in the right hemiabdomen with moderate surrounding free fluid and moderate mesenteric fat stranding. This suggests an element of enteritis. Correlate with clinical and laboratory parameters as regards further evaluation and follow up.  2. Details and other findings as discussed above.               Narrative:    START OF REPORT:  Technique: CT of the abdomen and pelvis was performed with axial images as well as sagittal and coronal reconstruction images with intravenous contrast.    Comparison: Comparison is with  study dated 2018-03-01 08:51:49.    Clinical History: Abdominal pain, post-op.    Dosage Information: Automated Exposure Control was utilized 813.29 mGy.cm.    Findings:  Lines and Tubes: None.  Thorax:  Lungs: There is mild nonspecific dependent change at the lung bases. No focal infiltrate or consolidation is seen.  Pleura: No effusions or thickening.  Heart: The heart size is within normal limits.  Abdomen:  Abdominal Wall: No abdominal wall pathology is seen.  Liver: Moderate fatty infiltration of the liver is present.  Biliary System: No extrahepatic biliary duct dilatation is seen.  Gallbladder: Surgical clips are seen in the gallbladder fossa consistent with prior cholecystectomy.  Pancreas: The pancreas appears unremarkable.  Spleen: The spleen appears unremarkable.  Adrenals: Thereis stable prominence of the left adrenal gland. This may represent adrenal hyperplasia.  Kidneys: The kidneys appear unremarkable with no stones cysts masses or hydronephrosis.  Aorta: There is minimal calcification of the abdominal aorta and its branches.  IVC: Unremarkable.  Bowel: Post srugical change is seen with a suture line in the right upper quadrant.  Esophagus: The visualized esophagus appears unremarkable.  Stomach: The stomach appears unremarkable.  Duodenum: Unremarkable appearing duodenum.  Small Bowel: There are numerous fluid filled small bowel loops which are moderately distended with no definite transition point identified. There is pronounced wall thickening with mucosal enhancement of multiple loops of small bowel in the right hemiabdomen with moderate surrounding free fluid and moderate mesenteric fat stranding. This suggests an element of enteritis.  Colon: Nondistended.  Appendix: No appendix is identified.  Peritoneum: No free intraperitoneal air is seen.    Pelvis:  Bladder: The bladder is nondistended but appears otherwise unremarkable.  Male:  Prostate gland: The prostate gland appears  unremarkable.    Bony structures:  Dorsal Spine: There is mild spondylosis of the visualized dorsal spine.  Bony Pelvis: There is mild degenerative change of the bilateral hips.                                       I have reviewed all pertinent imaging results/findings within the past 24 hours.    ASSESSMENT & PLAN:    Reynaldo Patton is a 65 y.o. male with longstanding history of Crohn's already s/p colon resection x2 and history of bowel obstruction in the past who presents with what appears to be either early bowel obstruction vs enteritis. Reassuring abdominal exam, vital signs within normal limits. Labs reassuring. Agree with admission for monitoring and conservative management.     - no indications for urgent or emergent surgical intervention  - bowel rest with NPO and mIVF  - if uncontrolled nausea and vomiting, would recommend NGT placement   - agree with GI consult  - will continue to follow for repeat abdominal exam  - reach out with any questions, concerns, or acute changes in abdominal exam     David Torres MD  [facility]  02/16/2025           [1]   Social History  Tobacco Use   Smoking Status Never   Smokeless Tobacco Never

## 2025-02-17 VITALS
TEMPERATURE: 99 F | SYSTOLIC BLOOD PRESSURE: 178 MMHG | HEART RATE: 58 BPM | RESPIRATION RATE: 16 BRPM | BODY MASS INDEX: 32.58 KG/M2 | OXYGEN SATURATION: 98 % | DIASTOLIC BLOOD PRESSURE: 75 MMHG | WEIGHT: 220 LBS | HEIGHT: 69 IN

## 2025-02-17 PROBLEM — K56.690 OTHER PARTIAL INTESTINAL OBSTRUCTION: Status: ACTIVE | Noted: 2025-02-17

## 2025-02-17 PROBLEM — K50.90 CROHN DISEASE: Status: ACTIVE | Noted: 2025-02-17

## 2025-02-17 LAB
ALBUMIN SERPL-MCNC: 3.9 G/DL (ref 3.4–4.8)
ALBUMIN/GLOB SERPL: 1.6 RATIO (ref 1.1–2)
ALP SERPL-CCNC: 73 UNIT/L (ref 40–150)
ALT SERPL-CCNC: 33 UNIT/L (ref 0–55)
ANION GAP SERPL CALC-SCNC: 7 MEQ/L
AST SERPL-CCNC: 18 UNIT/L (ref 5–34)
BILIRUB SERPL-MCNC: 1.2 MG/DL
BUN SERPL-MCNC: 10.3 MG/DL (ref 8.4–25.7)
CALCIUM SERPL-MCNC: 8.5 MG/DL (ref 8.8–10)
CHLORIDE SERPL-SCNC: 105 MMOL/L (ref 98–107)
CO2 SERPL-SCNC: 29 MMOL/L (ref 23–31)
CREAT SERPL-MCNC: 1.27 MG/DL (ref 0.72–1.25)
CREAT/UREA NIT SERPL: 8
GFR SERPLBLD CREATININE-BSD FMLA CKD-EPI: >60 ML/MIN/1.73/M2
GLOBULIN SER-MCNC: 2.5 GM/DL (ref 2.4–3.5)
GLUCOSE SERPL-MCNC: 94 MG/DL (ref 82–115)
OHS QRS DURATION: 86 MS
OHS QTC CALCULATION: 446 MS
POTASSIUM SERPL-SCNC: 3.2 MMOL/L (ref 3.5–5.1)
PROT SERPL-MCNC: 6.4 GM/DL (ref 5.8–7.6)
SODIUM SERPL-SCNC: 141 MMOL/L (ref 136–145)

## 2025-02-17 PROCEDURE — 80053 COMPREHEN METABOLIC PANEL: CPT | Performed by: STUDENT IN AN ORGANIZED HEALTH CARE EDUCATION/TRAINING PROGRAM

## 2025-02-17 PROCEDURE — 36415 COLL VENOUS BLD VENIPUNCTURE: CPT | Performed by: STUDENT IN AN ORGANIZED HEALTH CARE EDUCATION/TRAINING PROGRAM

## 2025-02-17 PROCEDURE — 25000003 PHARM REV CODE 250: Performed by: STUDENT IN AN ORGANIZED HEALTH CARE EDUCATION/TRAINING PROGRAM

## 2025-02-17 RX ADMIN — AMLODIPINE BESYLATE 10 MG: 5 TABLET ORAL at 08:02

## 2025-02-17 RX ADMIN — LISINOPRIL 40 MG: 20 TABLET ORAL at 08:02

## 2025-02-17 RX ADMIN — CARVEDILOL 25 MG: 12.5 TABLET, FILM COATED ORAL at 08:02

## 2025-02-17 RX ADMIN — PANTOPRAZOLE SODIUM 40 MG: 40 TABLET, DELAYED RELEASE ORAL at 08:02

## 2025-02-17 RX ADMIN — RANOLAZINE 500 MG: 500 TABLET, FILM COATED, EXTENDED RELEASE ORAL at 08:02

## 2025-02-17 RX ADMIN — CLOPIDOGREL BISULFATE 75 MG: 75 TABLET ORAL at 08:02

## 2025-02-17 RX ADMIN — ATORVASTATIN CALCIUM 40 MG: 40 TABLET, FILM COATED ORAL at 08:02

## 2025-02-17 NOTE — PLAN OF CARE
02/17/25 1154   Discharge Assessment   Assessment Type Discharge Planning Assessment   Confirmed/corrected address, phone number and insurance Yes   Confirmed Demographics Correct on Facesheet   Source of Information patient   When was your last doctors appointment?   (3 weeks ago)   Communicated [facility] with patient/caregiver Yes   Reason For Admission Exacerbation of Crohns   People in Home spouse   Do you expect to return to your current living situation? Yes   Do you have help at home or someone to help you manage your care at home? Yes   Who are your caregiver(s) and their phone number(s)? Lu Patton (Spouse)  [phone] (   Prior to hospitilization cognitive status: Alert/Oriented   Current cognitive status: Alert/Oriented   Walking or Climbing Stairs Difficulty no   Dressing/Bathing Difficulty no   Equipment Currently Used at Home blood pressure machine;CPAP   Readmission within 30 days? No   Patient currently being followed by outpatient case management? No   Do you currently have service(s) that help you manage your care at home? No   Do you take prescription medications? Yes   Do you have prescription coverage? Yes   Coverage bcbs   Do you have any problems affording any of your prescribed medications? No   Is the patient taking medications as prescribed? yes   Who is going to help you get home at discharge? Lu Patton (Spouse)  [phone] (   How do you get to doctors appointments? car, drives self   Are you on dialysis? No   Do you take coumadin? No   Discharge Plan A Home with family   Discharge Plan B Home with family   DME Needed Upon Discharge  none   Discharge Plan discussed with: Patient   Transition of Care Barriers None   Physical Activity   On average, how many days per week do you engage in moderate to strenuous exercise (like a brisk walk)? 0 days   On average, how many minutes do you engage in exercise at this level? 0 min   Financial Resource Strain   How hard is it for you to pay for  the very basics like food, housing, medical care, and heating? Not very   Housing Stability   In the last 12 months, was there a time when you were not able to pay the mortgage or rent on time? N   At any time in the past 12 months, were you homeless or living in a shelter (including now)? N   Food Insecurity   Within the past 12 months, you worried that your food would run out before you got the money to buy more. Never true   Stress   Do you feel stress - tense, restless, nervous, or anxious, or unable to sleep at night because your mind is troubled all the time - these days? Not at all   Social Isolation   How often do you feel lonely or isolated from those around you?  Never   Alcohol Use   Q1: How often do you have a drink containing alcohol? Never   Utilities   In the past 12 months has the electric, gas, oil, or water company threatened to shut off services in your home? No   Health Literacy   How often do you need to have someone help you when you read instructions, pamphlets, or other written material from your doctor or pharmacy? Never   OTHER   Name(s) of People in Home Lu Patton (Spouse)  [phone] (

## 2025-02-17 NOTE — PROGRESS NOTES
Acute Care Surgery   Progress Note    Patient Name: Reynaldo Patton  YOB: 1960  Date: 02/17/2025 11:22 AM  Date of Admission: 2/16/2025  HD#1  POD#* No surgery found *    PRESENTING HISTORY   Chief Complaint/Reason for Admission: <principal problem not specified>  History source(s): patient  History of Present Illness:  Reynaldo Patton is a 65 y.o. male with PMHx of Crohn's s/p colon rxn x2 on Infliximab, CAD s/p PCI on DAPT, SBO, VIOLET, HTN, meningioma s/p crani, s/p lap tish who presents with abdominal pain and distention with nausea and 1 episode of vomiting this morning that all began approximately 12 hours ago.  The pain was initially on his left side and became generalized bloating.  He vomited once this morning, prompting him to come to the emergency room.  This feels similar to prior SBO approximately 5 years ago.  Last oral intake was yesterday evening.  Last bowel movement was yesterday morning and was normal.  He has not had bowel function aside from a small amount of liquid stool.  He has had some sweats but no measured fevers.  Last bowel obstruction was resolved conservatively during admission with bowel rest.  Last infliximab infusion was approximately 5 weeks ago, he gets them every 8 weeks.  Last colon surgery was over 10 years ago.  He reports that during both colon resections, there was a small amount of adherent bowel that was also resected.  No blood in vomit or stool.      INTERVAL HISTORY:   Afebrile VS WNL, RA  Vomited a few  times yesterday, attributes this to nausea related to dilaudid  No belly pain, is passing gas but no BMs yet  Feels like impending BM  No current nausea, feeling improved     OBJECTIVE:   Vital Signs:  VITAL SIGNS: 24 HR MIN & MAX LAST   Temp  Min: 97.4 °F (36.3 °C)  Max: 98.7 °F (37.1 °C)  98 °F (36.7 °C)   BP  Min: 124/72  Max: 166/87  (!) 163/72    Pulse  Min: 51  Max: 68  (!) 58    Resp  Min: 18  Max: 18  18    SpO2  Min: 95 %  Max: 98 %   "98 %      HT: 5' 9" (175.3 cm)  WT: 99.8 kg (220 lb)  BMI: 32.5     Intake/output:  Intake/Output - Last 3 Shifts         02/15 0700 02/16 0659 02/16 0700 02/17 0659 02/17 0700 02/18 0659    Urine (mL/kg/hr)  350 (0.1)     Total Output  350     Net  -350                    Intake/Output Summary (Last 24 hours) at 2/17/2025 1112  Last data filed at 2/17/2025 0422  Gross per 24 hour   Intake --   Output 350 ml   Net -350 ml         Physical Exam:  General: Well developed, well nourished, no acute distress  HEENT: Normocephalic, PERRL  CV: RR  Resp: NWOB  GI: soft, distended, mild tenderness in RLQ, no guarding or rebound. Midline incisional scar. No hernias.   MSK: No muscle atrophy, cyanosis, peripheral edema, moving all extremities spontaneously  Neuro:  CNII-XII grossly intact, alert and oriented to person, place, and time    Labs:  Troponin:  Recent Labs     02/16/25  0649   TROPONINI <0.010     CBC:  Recent Labs     02/16/25  0649   WBC 8.69   RBC 5.83   HGB 15.4   HCT 46.0      MCV 78.9*   MCH 26.4*   MCHC 33.5     CMP:  Recent Labs     02/16/25  0649   CALCIUM 9.1   ALBUMIN 4.4      K 4.1   CO2 26      BUN 8.9   CREATININE 1.34*   ALKPHOS 85   ALT 39   AST 34   BILITOT 1.0     Lactic Acid:  Recent Labs     02/16/25  0648   LACTATE 1.5     ETOH:  No results for input(s): "ETHANOL" in the last 72 hours.   Urine Drug Screen:  No results for input(s): "COCAINE", "OPIATE", "BARBITURATE", "AMPHETAMINE", "FENTANYL", "CANNABINOIDS", "MDMA" in the last 72 hours.    Invalid input(s): "BENZODIAZEPINE", "PHENCYCLIDINE"   ABG:  No results for input(s): "PH", "PO2", "PCO2", "HCO3", "BE" in the last 168 hours.   I have reviewed all pertinent lab results within the past 24 hours.    Diagnostic Results:  Results for orders placed or performed during the hospital encounter of 02/16/25 (from the past 2160 hours)   CT Abdomen Pelvis With IV Contrast NO Oral Contrast    Narrative    EXAMINATION:  CT ABDOMEN " PELVIS WITH IV CONTRAST    CLINICAL HISTORY:  Abdominal pain, post-op;    TECHNIQUE:  Low dose axial images, sagittal and coronal reformations were obtained from the lung bases to the pubic symphysis following the IV administration of contrast. Automatic exposure control (AEC) is utilized to reduce patient radiation exposure.    COMPARISON:  03/01/2018    FINDINGS:  Lines and Tubes: None.    Thorax:Lungs: There is mild nonspecific dependent change at the lung bases. No focal infiltrate or consolidation is seen.    Pleura: No effusions or thickening.    Heart: The heart size is within normal limits.    Abdomen:Abdominal Wall: No abdominal wall pathology is seen.    Liver: Moderate fatty infiltration of the liver is present.    Biliary System: No extrahepatic biliary duct dilatation is seen.    Gallbladder: Surgical clips are seen in the gallbladder fossa consistent with prior cholecystectomy.    Pancreas: The pancreas appears unremarkable.    Spleen: The spleen appears unremarkable.    Adrenals: There is stable prominence of the left adrenal gland. This may represent adrenal hyperplasia.    Kidneys: The kidneys appear unremarkable with no stones cysts masses or hydronephrosis.    Aorta: There is minimal calcification of the abdominal aorta and its branches.    IVC: Unremarkable    Bowel: Post surgical change is seen with a suture line in the right upper quadrant.    Esophagus: The visualized esophagus appears unremarkable.    Stomach: The stomach appears unremarkable.    Duodenum: Unremarkable appearing duodenum.    Small Bowel: There are numerous fluid filled small bowel loops which are moderately distended with no definite transition point identified. There is pronounced wall thickening with mucosal enhancement of multiple loops of small bowel in the right hemiabdomen with moderate surrounding free fluid and moderate mesenteric fat stranding. This suggests an element of enteritis    Colon:  Nondistended.    Appendix: No appendix is identified.    Peritoneum: No free intraperitoneal air is seen.    Pelvis    Bladder: The bladder is nondistended but appears otherwise unremarkable.    Male:Prostate gland: The prostate gland appears unremarkable    Bony structures:Dorsal Spine: There is mild spondylosis of the visualized dorsal spine.    Bony Pelvis: There is mild degenerative change of the bilateral hips.      Impression    1. There are numerous fluid filled small bowel loops which are moderately distended with no definite transition point identified. There is pronounced wall thickening with mucosal enhancement of multiple loops of small bowel in the right hemiabdomen with moderate surrounding free fluid and moderate mesenteric fat stranding. This suggests an element of enteritis. Correlate with clinical and laboratory parameters as regards further evaluation and follow up.    2. Details and other findings as discussed above.    There is concurrence with the preliminary report above with 1 additional finding of some perihepatic fluid.  This is likely reactive.  Short-term follow-up is recommended of the enteritis as this may represent exacerbation of the patient's Crohn's disease.    This report was flagged in Epic as abnormal.      Electronically signed by: Lewis Javier  Date:    02/16/2025  Time:    08:17       Results for orders placed or performed during the hospital encounter of 02/16/25 (from the past 2160 hours)   MRI Enterography (XPD)    Impression    Active inflammatory bowel disease involving a long segment of the shirley terminal ileum.  Mild upstream dilatation of the small bowel without convincing obstruction      Electronically signed by: Albino Roman  Date:    02/17/2025  Time:    09:38        I have reviewed all pertinent imaging results/findings within the past 24 hours.    ASSESSMENT & PLAN:    Reynaldo Patton is a 65 y.o. male with longstanding history of Crohn's already s/p colon  resection x2 and history of bowel obstruction in the past who presents with what appears to be either early bowel obstruction vs enteritis. Reassuring abdominal exam again today, vital signs within normal limits. Labs reassuring. MRE shows inflammatory changes without bowel obstruction. Patient feeling much improved today.     - again, no indications for urgent or emergent surgical intervention  - defer diet to GI and primary   - reach out with any questions, concerns, or acute changes in abdominal exam     David Torres MD  [employer]  02/17/2025

## 2025-02-17 NOTE — PROGRESS NOTES
[facility]  Hospital Medicine Progress Note        Chief Complaint: Inpatient Follow-up for     HPI:   65-year-old  male with a past medical history of CAD status post PCI on DA PT, VIOLET, meningioma, status post craniotomy, Crohn's disease, GERD, previous history of small-bowel obstruction, hyperlipidemia and hypertension who presented to the ED with abdominal pain, nausea and vomiting. CT A/P showed numerous fluid-filled small bowel loops moderately distended with no definite transition point, pronounced wall thickening with mucosal enhancement of multiple loops of small bowel in right hemiabdomen with moderate surrounding free fluid and moderate mesenteric fat stranding indicating element of enteritis.  Patient made NPO and kept on IVF.  Surgery consulted with no plans for urgent surgical intervention.  GI consulted.  Admitted to Hospital Medicine.  Patient had several episodes of emesis on 02/16.    Interval Hx:   Today, patient reported feeling better no further episodes of emesis.  Had BM this morning.  Cleared for clear liquids by GI.  Underwent MR enterography which showed active inflammatory bowel disease involving long segment of shirley terminal ileum with mild upstream dilatation of small bowel without convincing obstruction.  Will continue following GI recommendations.    Case was discussed with patient's nurse on the floor.    Objective/physical exam:  General: In no acute distress, afebrile  Chest: Clear to auscultation bilaterally  Heart: RRR, +S1, S2, no appreciable murmur  Abdomen: Soft, nontender, BS +  MSK: Warm, no lower extremity edema, no clubbing or cyanosis  Neurologic: Alert and oriented x4, Cranial nerve II-XII intact, Strength 5/5 in all 4 extremities    VITAL SIGNS: 24 HRS MIN & MAX LAST   Temp  Min: 97.4 °F (36.3 °C)  Max: 98.7 °F (37.1 °C) 98 °F (36.7 °C)   BP  Min: 125/76  Max: 166/87 (!) 163/72   Pulse  Min: 51  Max: 68  (!) 58   Resp  Min: 18   Max: 18 18   SpO2  Min: 95 %  Max: 98 % 98 %     I have reviewed the following labs:  Recent Labs   Lab 02/16/25  0649   WBC 8.69   RBC 5.83   HGB 15.4   HCT 46.0   MCV 78.9*   MCH 26.4*   MCHC 33.5   RDW 14.0      MPV 9.4     Recent Labs   Lab 02/16/25  0649 02/17/25  1200    141   K 4.1 3.2*    105   CO2 26 29   BUN 8.9 10.3   CREATININE 1.34* 1.27*   CALCIUM 9.1 8.5*   MG 1.80  --    ALBUMIN 4.4 3.9   ALKPHOS 85 73   ALT 39 33   AST 34 18   BILITOT 1.0 1.2     Assessment/Plan:  Abd Pain, N/V 2/2 Enteritis vs SBO  Crohn's Disease  HTN  HLD  CAD/PCI  Hypokalemia    Continues to be admitted   Reporting no new complaints   Cleared for clear liquids   GI on board   Surgery on board   On IV NS 75 mL/hour; renal indices at baseline  Continued on amlodipine, atorvastatin, Coreg, Plavix, Protonix, ranolazine, tizanidine   Will replace K    VTE prophylaxis: Lovenox    Patient condition:  Stable    Anticipated discharge and Disposition:     Pending    All diagnosis and differential diagnosis have been reviewed; assessment and plan has been documented; I have personally reviewed the labs and test results that are presently available; I have reviewed the patients medication list; I have reviewed the consulting providers response and recommendations. I have reviewed or attempted to review medical records based upon their availability    All of the patient's questions have been  addressed and answered. Patient's is agreeable to the above stated plan. I will continue to monitor closely and make adjustments to medical management as needed.    Portions of this note dictated using EMR integrated voice recognition software, and may be subject to voice recognition errors not corrected at proofreading. Please contact writer for clarification if needed.     Radiology:  I have personally reviewed the following imaging and agree with the radiologist.     MRI Enterography (XPD)  Narrative: EXAMINATION:  MRI ENTEROGRAPHY  (XPD)    CLINICAL HISTORY:  Crohns ileal with abdominal pain;    TECHNIQUE:  Multiplanar multisequence MRI of the abdomen and pelvis performed without and with gadolinium based IV contrast according to enterography protocol.  Enteric contrast also administered.    COMPARISON:  CT earlier today    FINDINGS:  Imaging is degraded by motion and other artifacts.  Patient appears to be status post right hemicolectomy and resection of the terminal ileum.  The shirley terminal ileum demonstrates wall thickening and mucosal enhancement.  This involves a long segment of the distal ileum.  There is mild upstream dilatation of the small bowel without convincing obstruction.  There is no drainable peritoneal collection.  No significantly enlarged mesenteric lymph nodes.  No convincing fistula.    No significant abnormality of the liver, spleen, pancreas or adrenals.  Kidneys are incompletely imaged on some sequences.  No hydronephrosis.  Abdominal aorta normal in caliber.  No significant ascites.  No retroperitoneal adenopathy.  There is mild bladder wall thickening.  Impression: Active inflammatory bowel disease involving a long segment of the shirley terminal ileum.  Mild upstream dilatation of the small bowel without convincing obstruction    Electronically signed by: Albino Roman  Date:    02/17/2025  Time:    09:38      Ba Haji MD  Department of Hospital Medicine   [facility]   02/17/2025

## 2025-02-17 NOTE — PROGRESS NOTES
"[facility]   Progress Note          SUBJECTIVE:        ROS: Negative unless stated in HPI    MEDS: Reviewed in EMR    OBJECTIVE:  T 97.7 °F (36.5 °C)   BP (!) 148/97   P (!) 57   RR 18   O2 98 %  GENERAL: NAD; does not appear toxic  SKIN: no rash, no jaundice  HEENT: sclera non-icteric; PERRL  NECK: supple; no LAD  CHEST: CTA; nonlabored, equal expansion; no adventitious BS  CARDIOVASCULAR: RRR, S1S2; no murmur; strong, equal peripheral pulses; no edema  ABDOMEN:  active bowel sounds; abdomen soft, nondistended, nontender to palpation  EXTREMITIES: no cyanosis or clubbing  NEURO: AAO, baseline    Labs:  Recent Labs     02/16/25  0649   WBC 8.69   RBC 5.83   HGB 15.4   HCT 46.0   MCV 78.9*   MCH 26.4*   MCHC 33.5   RDW 14.0        No results for input(s): "LACTIC" in the last 72 hours.  No results for input(s): "INR", "APTT", "D-DIMER" in the last 72 hours.  No results for input(s): "HGBA1C", "CHOL", "TRIG", "LDL", "VLDL", "HDL" in the last 72 hours.   Recent Labs     02/16/25  0649      K 4.1   CO2 26   BUN 8.9   CREATININE 1.34*   GLUCOSE 125*   CALCIUM 9.1   MG 1.80   ALBUMIN 4.4   GLOBULIN 3.8*   ALKPHOS 85   ALT 39   AST 34   BILITOT 1.0   LIPASE 24   CRP 1.10     Recent Labs     02/16/25  0649   TROPONINI <0.010          Imaging: Reviewed pertinent imaging    ASSESSMENT / PLAN:  This is a 65 y.o. male patient of Dr. Hobson with a hx of ileocolonic Crohn's on Inflectra q8 weeks who presented to the ER with complaints of N/V and abdominal pain. Sudden onset 24hr ago. Reports he had a large amount of carrots which has precipitated an obstruction in the past. He has been in good control of his Crohn's disease on inflectra. He does not prior bowel resection and hx of small bowel strictures in the past but last colonoscopy in 2022 showed remission with normal biopsies. Denies infectious symptoms. CT showed multiple air-fluid loops of small bowel and possible enteritis. CRP is " normal along with WBC. ROS otherwise unremarkable.     Abdominal pain  Nausea / vomiting  Ileocolonic Chron's on Inflectra       2/16 MRE: Active inflammatory bowel disease involving a long segment of the shirley terminal ileum and mild upstream dilatation of the small bowel without convincing obstruction.    No indication for steroids given normal CRP.  F/u fecal calpro.  Rec continued bowel regimen   Close f/u with Dr. Hobson -- our office to arrange.    OK for DC from GI standpoint.   Discussed with patient, nursing, and primary.         Thank you for allowing us to participate in the care of Reynaldo Patton.    Stephie Guevara, RAMONA  [facility]

## 2025-02-17 NOTE — CONSULTS
Gastroenterology Consult    CC: Concern for Crohn's Flare    HPI  This is a 65 y.o. male patient of Dr. Hobson with a hx of ilealcolonicCrohn's on Inflectra q8 weeks who presented to the ER with complaints of N/V and abdominal pain. Sudden onset 24hr ago. Reports he had a large amount of carrots which has precipitated an obstruction in the past. He has been in good control of his Crohn's disease on inflectra. He does not prior bowel resection and hx of small bowel strictures in the past but last colonoscopy in 2022 showed remission with normal biopsies. Denies infectious symptoms. CT showed multiple air-fluid loops of small bowel and possible enteritis. CRP is normal along with WBC. ROS otherwise unremarkable.     Past Medical History:   Diagnosis Date    Coronary artery disease     Crohn's disease     GERD (gastroesophageal reflux disease)     Hypercholesterolemia     Hypertension        Past Surgical History:   Procedure Laterality Date    CHOLECYSTECTOMY      COLECTOMY      CORONARY STENT PLACEMENT  09/2015    [facility]    GALLBLADDER SURGERY      LEFT FRONTOTEMPORAL CRANIOTOMY FOR EXCISION FOR MENINGIOMA  06/24/2014    [facility]    LEFT FRONTOTEMPORAL RE-EXPLORATION & REMOVAL OF SCREW/PLATE  10/25/2016    [facility]       Social History[1]    Family History   Problem Relation Name Age of Onset    Cataracts Mother      Diabetes Mother      Heart disease Father         PCP: Patsy Caicedo FNP    Review of patient's allergies indicates:   Allergen Reactions    Strawberry Anaphylaxis        Current Medications[2]    Prescriptions Prior to Admission[3]      Review of Systems  General ROS: negative for - chills, fever or weight loss  Psychological ROS: negative for - hallucination, depression or suicidal ideation  Ophthalmic ROS: negative for - blurry vision, photophobia or eye pain  ENT ROS: negative for - epistaxis, sore throat or rhinorrhea  Respiratory ROS: no cough, shortness of breath, or wheezing  Cardiovascular ROS:  "no chest pain or dyspnea on exertion  Gastrointestinal ROS: N/V, Pain  Genito-Urinary ROS: no dysuria, trouble voiding, or hematuria  Musculoskeletal ROS: negative for - gait disturbance or muscular weakness  Neurological ROS: no syncope or seizures; no ataxia  Dermatological ROS: negative for pruritis, rash and jaundice    Physical Examination  BP (!) 149/81   Pulse 66   Temp 97.4 °F (36.3 °C) (Oral)   Resp 18   Ht 5' 9" (1.753 m)   Wt 99.8 kg (220 lb)   SpO2 96%   BMI 32.49 kg/m²   General appearance: alert, cooperative, no distress  HENT: Normocephalic, atraumatic, neck symmetrical, no nasal discharge   Eyes: conjunctivae/corneas clear, PERRL, EOM's intact  Lungs: no respiratory distress. Comfortable on RA.  Heart: regular rate. No edema.  Abdomen: Soft. NT. Nd.  Extremities: extremities symmetric; no clubbing, cyanosis, or edema  Integument: Skin color, texture, turgor normal; no jaundice  Neurologic: Alert and oriented X 3, normal strength, normal coordination and gait  Psychiatric: no pressured speech; normal affect; no evidence of impaired cognition       Recent Results (from the past 48 hours)   Lactic acid, plasma    Collection Time: 02/16/25  6:48 AM   Result Value Ref Range    Lactic Acid Level 1.5 0.5 - 2.2 mmol/L   Comprehensive metabolic panel    Collection Time: 02/16/25  6:49 AM   Result Value Ref Range    Sodium 144 136 - 145 mmol/L    Potassium 4.1 3.5 - 5.1 mmol/L    Chloride 104 98 - 107 mmol/L    CO2 26 23 - 31 mmol/L    Glucose 125 (H) 82 - 115 mg/dL    Blood Urea Nitrogen 8.9 8.4 - 25.7 mg/dL    Creatinine 1.34 (H) 0.72 - 1.25 mg/dL    Calcium 9.1 8.8 - 10.0 mg/dL    Protein Total 8.2 (H) 5.8 - 7.6 gm/dL    Albumin 4.4 3.4 - 4.8 g/dL    Globulin 3.8 (H) 2.4 - 3.5 gm/dL    Albumin/Globulin Ratio 1.2 1.1 - 2.0 ratio    Bilirubin Total 1.0 <=1.5 mg/dL    ALP 85 40 - 150 unit/L    ALT 39 0 - 55 unit/L    AST 34 5 - 34 unit/L    eGFR 59 mL/min/1.73/m2    Anion Gap 14.0 mEq/L    " BUN/Creatinine Ratio 7    CBC with Differential    Collection Time: 02/16/25  6:49 AM   Result Value Ref Range    WBC 8.69 4.50 - 11.50 x10(3)/mcL    RBC 5.83 4.70 - 6.10 x10(6)/mcL    Hgb 15.4 14.0 - 18.0 g/dL    Hct 46.0 42.0 - 52.0 %    MCV 78.9 (L) 80.0 - 94.0 fL    MCH 26.4 (L) 27.0 - 31.0 pg    MCHC 33.5 33.0 - 36.0 g/dL    RDW 14.0 11.5 - 17.0 %    Platelet 243 130 - 400 x10(3)/mcL    MPV 9.4 7.4 - 10.4 fL    Neut % 72.4 %    Lymph % 20.6 %    Mono % 5.9 %    Eos % 0.6 %    Basophil % 0.2 %    Imm Grans % 0.3 %    Neut # 6.29 2.1 - 9.2 x10(3)/mcL    Lymph # 1.79 0.6 - 4.6 x10(3)/mcL    Mono # 0.51 0.1 - 1.3 x10(3)/mcL    Eos # 0.05 0 - 0.9 x10(3)/mcL    Baso # 0.02 <=0.2 x10(3)/mcL    Imm Gran # 0.03 0.00 - 0.04 x10(3)/mcL    NRBC% 0.0 %   Lipase    Collection Time: 02/16/25  6:49 AM   Result Value Ref Range    Lipase Level 24 <=60 U/L   Troponin I    Collection Time: 02/16/25  6:49 AM   Result Value Ref Range    Troponin-I <0.010 0.000 - 0.045 ng/mL   Magnesium    Collection Time: 02/16/25  6:49 AM   Result Value Ref Range    Magnesium Level 1.80 1.60 - 2.60 mg/dL   Sedimentation Rate    Collection Time: 02/16/25  6:49 AM   Result Value Ref Range    Sed Rate 22 (H) 0 - 20 mm/hr   C-reactive protein    Collection Time: 02/16/25  6:49 AM   Result Value Ref Range    CRP 1.10 <5.00 mg/L   Urinalysis, Reflex to Urine Culture    Collection Time: 02/16/25  8:14 AM    Specimen: Urine   Result Value Ref Range    Color, UA Colorless Yellow, Light-Yellow, Colorless, Straw, Dark-Yellow    Appearance, UA Clear Clear    Specific Gravity, UA 1.023 1.005 - 1.030    pH, UA 7.5 5.0 - 8.5    Protein, UA 1+ (A) Negative    Glucose, UA Normal Negative, Normal    Ketones, UA Negative Negative    Blood, UA Negative Negative    Bilirubin, UA Negative Negative    Urobilinogen, UA Normal 0.2, 1.0, Normal    Nitrites, UA Negative Negative    Leukocyte Esterase, UA Negative Negative    RBC, UA 0-5 None Seen, 0-2, 3-5, 0-5 /HPF    WBC,  UA 0-5 None Seen, 0-2, 3-5, 0-5 /HPF    Bacteria, UA Trace None Seen, Trace /HPF    Squamous Epithelial Cells, UA Trace None Seen, Trace, Rare /HPF    Mucous, UA Trace (A) None Seen /LPF       CT Abdomen Pelvis With IV Contrast NO Oral Contrast  Result Date: 2/16/2025  EXAMINATION: CT ABDOMEN PELVIS WITH IV CONTRAST CLINICAL HISTORY: Abdominal pain, post-op; TECHNIQUE: Low dose axial images, sagittal and coronal reformations were obtained from the lung bases to the pubic symphysis following the IV administration of contrast. Automatic exposure control (AEC) is utilized to reduce patient radiation exposure. COMPARISON: 03/01/2018 FINDINGS: Lines and Tubes: None. Thorax:Lungs: There is mild nonspecific dependent change at the lung bases. No focal infiltrate or consolidation is seen. Pleura: No effusions or thickening. Heart: The heart size is within normal limits. Abdomen:Abdominal Wall: No abdominal wall pathology is seen. Liver: Moderate fatty infiltration of the liver is present. Biliary System: No extrahepatic biliary duct dilatation is seen. Gallbladder: Surgical clips are seen in the gallbladder fossa consistent with prior cholecystectomy. Pancreas: The pancreas appears unremarkable. Spleen: The spleen appears unremarkable. Adrenals: There is stable prominence of the left adrenal gland. This may represent adrenal hyperplasia. Kidneys: The kidneys appear unremarkable with no stones cysts masses or hydronephrosis. Aorta: There is minimal calcification of the abdominal aorta and its branches. IVC: Unremarkable Bowel: Post surgical change is seen with a suture line in the right upper quadrant. Esophagus: The visualized esophagus appears unremarkable. Stomach: The stomach appears unremarkable. Duodenum: Unremarkable appearing duodenum. Small Bowel: There are numerous fluid filled small bowel loops which are moderately distended with no definite transition point identified. There is pronounced wall thickening with  mucosal enhancement of multiple loops of small bowel in the right hemiabdomen with moderate surrounding free fluid and moderate mesenteric fat stranding. This suggests an element of enteritis Colon: Nondistended. Appendix: No appendix is identified. Peritoneum: No free intraperitoneal air is seen. Pelvis Bladder: The bladder is nondistended but appears otherwise unremarkable. Male:Prostate gland: The prostate gland appears unremarkable Bony structures:Dorsal Spine: There is mild spondylosis of the visualized dorsal spine. Bony Pelvis: There is mild degenerative change of the bilateral hips.     1. There are numerous fluid filled small bowel loops which are moderately distended with no definite transition point identified. There is pronounced wall thickening with mucosal enhancement of multiple loops of small bowel in the right hemiabdomen with moderate surrounding free fluid and moderate mesenteric fat stranding. This suggests an element of enteritis. Correlate with clinical and laboratory parameters as regards further evaluation and follow up. 2. Details and other findings as discussed above. There is concurrence with the preliminary report above with 1 additional finding of some perihepatic fluid.  This is likely reactive.  Short-term follow-up is recommended of the enteritis as this may represent exacerbation of the patient's Crohn's disease. This report was flagged in Epic as abnormal. Electronically signed by: Lewis Javier Date:    02/16/2025 Time:    08:17      I have personally reviewed these labs and images    Assessment:   66yo Male patient of Dr. Hobson with hx of ileocolonic Crohn's on Inflectra admitted with N/V and abominal pain. Labs are largely unremarkable with normal WBC and CRP. Imaging with fluid filled SB loops and concerns for enteritis. The lack of evidence for inflammation in the serum is interesting and goes against Crohn's ellen. WE will check stoop PCR for infection and calprotectin. We will  also attempt MR enterography to further clarify his small bowel disease activity. Hold on steroids for now.    Plan:  OK for clears  Stool GI PCR  Stool Calprotectin  MR Enterography vs CT Enterography (will need to wait 24hr given recent contrast load)  Continue supportive care.     Albino Da Silva MD  Gastroenterology         [1]   Social History  Tobacco Use    Smoking status: Never    Smokeless tobacco: Never   Substance Use Topics    Alcohol use: Not Currently    Drug use: Never   [2]   Current Facility-Administered Medications   Medication Dose Route Frequency Provider Last Rate Last Admin    0.9% NaCl infusion   Intravenous Continuous Bux, Charissa, DO        amLODIPine tablet 10 mg  10 mg Oral Daily Bux, Charissa, DO   10 mg at 02/16/25 1158    atorvastatin tablet 40 mg  40 mg Oral Daily Bux, Charissa, DO   40 mg at 02/16/25 1158    carvediloL tablet 25 mg  25 mg Oral BID Bux, Charissa, DO   25 mg at 02/16/25 1157    clopidogreL tablet 75 mg  75 mg Oral Daily Bux, Charissa, DO   75 mg at 02/16/25 1157    enoxaparin injection 40 mg  40 mg Subcutaneous Q24H (prophylaxis, 1700) Bux, Charissa, DO   40 mg at 02/16/25 1803    HYDROcodone-acetaminophen  mg per tablet 1 tablet  1 tablet Oral Q4H PRN Bux, Charissa, DO        HYDROmorphone (PF) injection 0.5 mg  0.5 mg Intravenous Q6H PRN Bux, Charissa, DO   0.5 mg at 02/16/25 1754    lisinopriL tablet 40 mg  40 mg Oral BID Bux, Charissa, DO        ondansetron injection 4 mg  4 mg Intravenous Q4H PRN Bux, Charissa, DO   4 mg at 02/16/25 1441    pantoprazole EC tablet 40 mg  40 mg Oral Daily Bux, Charissa, DO   40 mg at 02/16/25 1157    ranolazine 12 hr tablet 500 mg  500 mg Oral BID Bux, Charissa, DO   500 mg at 02/16/25 1158    tiZANidine tablet 8 mg  8 mg Oral Nightly Bux, Charissa, DO       [3]   Facility-Administered Medications Prior to Admission   Medication Dose Route Frequency Provider Last Rate Last Admin    hylan g-f 20 (SYNVISC ONE) 48 mg/6 mL injection 48 mg  48 mg  Intra-articular 1 time in Clinic/HOD Reg Smith PA-C         Medications Prior to Admission   Medication Sig Dispense Refill Last Dose/Taking    amLODIPine (NORVASC) 10 MG tablet Take 10 mg by mouth once daily.   2/15/2025    aspirin (ECOTRIN) 81 MG EC tablet 0   2/15/2025    calcium-vitamin D tablet 600 mg-200 units tablet   Past Week    carvediloL (COREG) 25 MG tablet Take 25 mg by mouth 2 (two) times daily.   2/15/2025    clopidogreL (PLAVIX) 75 mg tablet Take 75 mg by mouth.   2/15/2025    cyanocobalamin 1,000 mcg/mL injection INJECT 1 ML INTRAMUSCULARLY EVERY OTHER WEEK FOR 8 WEEKS THEN 1 ML EVERY MONTH   Past Week    furosemide (LASIX) 20 MG tablet TAKE 1 TABLET BY MOUTH DAILY ON MONDAY AND FRIDAY TAKE 2 TABLETS   2/15/2025    lisinopriL (PRINIVIL,ZESTRIL) 40 MG tablet Take 40 mg by mouth 2 (two) times daily.   2/15/2025    losartan-hydrochlorothiazide 100-12.5 mg (HYZAAR) 100-12.5 mg Tab tablet   2/15/2025    pantoprazole (PROTONIX) 40 MG tablet Take 40 mg by mouth once daily.   2/15/2025    rosuvastatin (CRESTOR) 20 MG tablet Take 20 mg by mouth once daily.   2/15/2025    tadalafiL (CIALIS) 5 MG tablet Take 5 mg by mouth.   2/15/2025    VITAMIN B COMPLEX ORAL Take 1 tablet by mouth once daily.   Past Week    ALAHIST D 10-17.5 mg Tab Take 1 tablet by mouth every 4 (four) hours.       carvedilol (COREG CR) 80 MG 24 hr capsule capsule       ciclopirox (LOPROX) 0.77 % Crea Apply 1 Application topically.       ciprofloxacin HCl (CIPRO) 250 MG tablet 28 tablets.       doxycycline (VIBRAMYCIN) 100 MG Cap Take 100 mg by mouth 2 (two) times daily.       enalapril (VASOTEC) 5 MG tablet 60       ergocalciferol (ERGOCALCIFEROL) 50,000 unit Cap 24 Capsule       fluticasone propionate (FLONASE) 50 mcg/actuation nasal spray USE 1 SPRAY INTO EACH NOSTRIL TWICE DAILY AS NEEDED       inFLIXimab-dyyb (INFLECTRA) 100 mg injection 0       KLOR-CON 10 10 mEq TbSR        LIDOcaine (LIDODERM) 5 % APPLY TO AFFECTED AREA  ONCE DAILY FOR 12 HOURS MAXIMUM DURATION THEN REMOVE AND DISCARD PATCH       meloxicam (MOBIC) 7.5 MG tablet Take 1 tablet by mouth daily as needed.       methocarbamoL (ROBAXIN) 750 MG Tab TAKE 1 TABLET BY MOUTH THREE TIMES A DAY AS NEEDED FOR SPASMS       minoxidiL (LONITEN) 2.5 MG tablet 60       mupirocin (BACTROBAN) 2 % ointment APPLY ON THE SKIN TWICE A DAY CLEAN AREA BEFORE EACH APPLICATION WITH ANTIBACTERIAL SOAP       ondansetron (ZOFRAN-ODT) 8 MG TbDL 40 tablets.       POLYTUSSIN DM,PYRILAMINE, 12.5-5-7.5 mg/5 mL Liqd SMARTSIG:10 Milliliter(s) By Mouth Every 4-6 Hours       predniSONE (DELTASONE) 20 MG tablet Take 20 mg by mouth.       ranolazine (RANEXA) 500 MG Tb12 Take 500 mg by mouth.       rosuvastatin (CRESTOR) 40 MG Tab tablet       sildenafiL (VIAGRA) 100 MG tablet 5       tadalafiL (CIALIS) 20 MG Tab Take 10-20 mg by mouth once daily.       tiZANidine (ZANAFLEX) 4 MG tablet Take 8 mg by mouth nightly.       zoledronic acid-mannitol & water (RECLAST) 5 mg/100 mL PgBk 5 mg intravenously

## 2025-02-18 ENCOUNTER — PATIENT OUTREACH (OUTPATIENT)
Dept: ADMINISTRATIVE | Facility: CLINIC | Age: 65
End: 2025-02-18
Payer: COMMERCIAL

## 2025-02-18 NOTE — PROGRESS NOTES
C3 nurse attempted to contact patient. The following occurred:   C3 nurse attempted to contact Reynaldo Patton for a TCC post hospital discharge follow up call. The patient has not been able to review his medication list from DC summary and compare with home medications, but he plans to do so this evening. C3 nurse will contact the patient tomorrow to review and update his [facility] medication list, to accurately reflect what the patient is currently taking.

## 2025-02-19 NOTE — PROGRESS NOTES
3rd attempt-C3 nurse attempted to contact Reynaldo Patton for a TCC post hospital discharge follow up call. No answer. Left voicemail with callback information, and OOC#. The patient does not have a scheduled HOSFU appointment, and the pt does not have an [facility] PCP.

## 2025-02-19 NOTE — PROGRESS NOTES
2nd attempt-C3 nurse attempted to contact Reynaldo Patton for a TCC post hospital discharge follow up call. No answer. Left voicemail with callback information. The patient does not have a scheduled HOSFU appointment, and the pt does not have an [facility] PCP.

## 2025-02-21 LAB
BACTERIA BLD CULT: NORMAL
BACTERIA BLD CULT: NORMAL

## 2025-02-25 ENCOUNTER — OFFICE VISIT (OUTPATIENT)
Dept: OPHTHALMOLOGY | Facility: CLINIC | Age: 65
End: 2025-02-25
Payer: COMMERCIAL

## 2025-02-25 VITALS — HEIGHT: 69 IN | BODY MASS INDEX: 32.58 KG/M2 | WEIGHT: 220 LBS

## 2025-02-25 DIAGNOSIS — H25.13 AGE-RELATED NUCLEAR CATARACT OF BOTH EYES: ICD-10-CM

## 2025-02-25 DIAGNOSIS — D32.9 MENINGIOMA: ICD-10-CM

## 2025-02-25 DIAGNOSIS — H04.129 DRY EYE: ICD-10-CM

## 2025-02-25 DIAGNOSIS — R51.9 CHRONIC RIGHT-SIDED HEADACHES: Primary | ICD-10-CM

## 2025-02-25 DIAGNOSIS — G89.29 CHRONIC RIGHT-SIDED HEADACHES: Primary | ICD-10-CM

## 2025-02-25 PROCEDURE — 99213 OFFICE O/P EST LOW 20 MIN: CPT | Mod: PBBFAC,PN

## 2025-02-25 RX ORDER — VALSARTAN 40 MG/1
40 TABLET ORAL EVERY MORNING
COMMUNITY
Start: 2024-11-11

## 2025-02-25 RX ORDER — HYDROCODONE BITARTRATE AND ACETAMINOPHEN 7.5; 325 MG/1; MG/1
1 TABLET ORAL EVERY 6 HOURS PRN
COMMUNITY
Start: 2024-12-31

## 2025-02-25 RX ORDER — CLINDAMYCIN HYDROCHLORIDE 150 MG/1
450 CAPSULE ORAL 3 TIMES DAILY
COMMUNITY
Start: 2024-12-31

## 2025-02-25 RX ORDER — DOXAZOSIN 2 MG/1
2 TABLET ORAL
COMMUNITY
Start: 2024-11-04

## 2025-02-25 RX ORDER — CEPHALEXIN 500 MG/1
500 CAPSULE ORAL 2 TIMES DAILY
COMMUNITY
Start: 2024-12-30

## 2025-02-25 RX ORDER — CIPROFLOXACIN 500 MG/1
500 TABLET ORAL 2 TIMES DAILY
COMMUNITY
Start: 2025-02-18

## 2025-02-25 NOTE — PROGRESS NOTES
Assessment /Plan     For exam results, see Encounter Report.    Chronic right-sided headaches    Meningioma    Age-related nuclear cataract of both eyes    Dry eye              OCTN 2/26/24: 97//99 all green ou     VF 2/26/24: reliable full ou       Brain mass s/p resection (left sided meningioma, required frontotemporal craniotomy in 2014)   R sided headaches - resolved   - MRI 1/2024 No acute intracranial abnormality.  No evidence for mass, hemorrhage or infarction.Prior left frontotemporal craniotomy.  No residual or recurrent enhancing mass identified.  - VF   2/26/24: reliable and full ou, no signs visual changes from brain resection   - octn  2/26/24 all green ou    - at appt in 2/2024 pt complained of right sided headaches since November 2023 with nausea - now resolved as of 2/25/25  - no evidence of ocular involvement or symptoms  - continue to monitor without OCT or VF    3. NSC ou  - 20/20 uncorrected ou   - monitor     4. Nye/mgd  - likely cause of tearing  - start ats qid and WC/LS BID - given handout      1 year dfe ou, sooner prn

## 2025-02-27 PROBLEM — D32.9 MENINGIOMA: Status: ACTIVE | Noted: 2025-02-27

## 2025-02-27 PROBLEM — H04.129 DRY EYE: Status: ACTIVE | Noted: 2025-02-27

## 2025-04-09 ENCOUNTER — OFFICE VISIT (OUTPATIENT)
Dept: ORTHOPEDICS | Facility: CLINIC | Age: 65
End: 2025-04-09
Payer: COMMERCIAL

## 2025-04-09 ENCOUNTER — HOSPITAL ENCOUNTER (OUTPATIENT)
Dept: RADIOLOGY | Facility: CLINIC | Age: 65
Discharge: HOME | End: 2025-04-09
Attending: PHYSICIAN ASSISTANT
Payer: COMMERCIAL

## 2025-04-09 VITALS
SYSTOLIC BLOOD PRESSURE: 156 MMHG | HEART RATE: 54 BPM | DIASTOLIC BLOOD PRESSURE: 81 MMHG | HEIGHT: 69 IN | BODY MASS INDEX: 32.58 KG/M2 | WEIGHT: 220 LBS

## 2025-04-09 DIAGNOSIS — G56.02 CARPAL TUNNEL SYNDROME OF LEFT WRIST: ICD-10-CM

## 2025-04-09 DIAGNOSIS — M79.642 LEFT HAND PAIN: ICD-10-CM

## 2025-04-09 DIAGNOSIS — M65.312 TRIGGER FINGER OF LEFT THUMB: ICD-10-CM

## 2025-04-09 DIAGNOSIS — M79.642 LEFT HAND PAIN: Primary | ICD-10-CM

## 2025-04-09 PROCEDURE — 99213 OFFICE O/P EST LOW 20 MIN: CPT | Mod: ,,, | Performed by: PHYSICIAN ASSISTANT

## 2025-04-09 PROCEDURE — 3079F DIAST BP 80-89 MM HG: CPT | Mod: CPTII,,, | Performed by: PHYSICIAN ASSISTANT

## 2025-04-09 PROCEDURE — 1159F MED LIST DOCD IN RCRD: CPT | Mod: CPTII,,, | Performed by: PHYSICIAN ASSISTANT

## 2025-04-09 PROCEDURE — 3077F SYST BP >= 140 MM HG: CPT | Mod: CPTII,,, | Performed by: PHYSICIAN ASSISTANT

## 2025-04-09 PROCEDURE — 3008F BODY MASS INDEX DOCD: CPT | Mod: CPTII,,, | Performed by: PHYSICIAN ASSISTANT

## 2025-04-09 PROCEDURE — 73130 X-RAY EXAM OF HAND: CPT | Mod: LT,,, | Performed by: PHYSICIAN ASSISTANT

## 2025-04-09 PROCEDURE — 4010F ACE/ARB THERAPY RXD/TAKEN: CPT | Mod: CPTII,,, | Performed by: PHYSICIAN ASSISTANT

## 2025-04-09 PROCEDURE — 1160F RVW MEDS BY RX/DR IN RCRD: CPT | Mod: CPTII,,, | Performed by: PHYSICIAN ASSISTANT

## 2025-04-09 NOTE — PROGRESS NOTES
Chief Complaint:   Chief Complaint   Patient presents with    Hand Pain     L thumb - reports numbness. Denies swelling. States the cyst has remained the same in growth. Last time it was popped was in 2022        History of present illness:    This is a 65 y.o. year old   History of Present Illness    CHIEF COMPLAINT:  - Right hand numbness and cyst    HPI:  Reynaldo presents for evaluation of right hand numbness and discomfort, particularly affecting the thumb and multiple fingers, including the ring finger, present since 2020. The numbness is constant throughout the day, sometimes affecting both thumbs, and can occur while sleeping, especially when lying on the side. He reports difficulty with fine motor tasks, stating he has trouble buttoning his shirt.    A recurrent cyst on the hand is mentioned, which was previously treated with an injection.    He has been seeing Dr. Cody for back issues, attempting to avoid surgery. He has found some relief through non-surgical treatments, but expresses a desire for quicker relief.    He denies any locking of the fingers or history of formal medical diagnoses. Reynaldo received an injection for a cyst on his hand in the past, which provided temporary relief but the cyst returned.          Current Outpatient Medications   Medication Sig    amLODIPine (NORVASC) 10 MG tablet Take 10 mg by mouth once daily.    carvediloL (COREG) 25 MG tablet Take 25 mg by mouth 2 (two) times daily.    cephALEXin (KEFLEX) 500 MG capsule Take 500 mg by mouth 2 (two) times daily.    ciprofloxacin HCl (CIPRO) 500 MG tablet Take 500 mg by mouth 2 (two) times daily.    clindamycin (CLEOCIN) 150 MG capsule Take 450 mg by mouth 3 (three) times daily.    clopidogreL (PLAVIX) 75 mg tablet Take 75 mg by mouth.    doxazosin (CARDURA) 2 MG tablet Take 2 mg by mouth.    HYDROcodone-acetaminophen (NORCO) 7.5-325 mg per tablet Take 1 tablet by mouth every 6 (six) hours as needed.    inFLIXimab-dyyb (INFLECTRA)  "100 mg injection 0    KLOR-CON 10 10 mEq TbSR     LIDOcaine (LIDODERM) 5 % APPLY TO AFFECTED AREA ONCE DAILY FOR 12 HOURS MAXIMUM DURATION THEN REMOVE AND DISCARD PATCH    lisinopriL (PRINIVIL,ZESTRIL) 40 MG tablet Take 40 mg by mouth 2 (two) times daily.    meloxicam (MOBIC) 7.5 MG tablet Take 1 tablet by mouth daily as needed.    methocarbamoL (ROBAXIN) 750 MG Tab TAKE 1 TABLET BY MOUTH THREE TIMES A DAY AS NEEDED FOR SPASMS    minoxidiL (LONITEN) 2.5 MG tablet 60    pantoprazole (PROTONIX) 40 MG tablet Take 40 mg by mouth once daily.    predniSONE (DELTASONE) 20 MG tablet Take 20 mg by mouth.    ranolazine (RANEXA) 500 MG Tb12 Take 500 mg by mouth.    rosuvastatin (CRESTOR) 20 MG tablet Take 20 mg by mouth once daily.    tadalafiL (CIALIS) 20 MG Tab Take 10-20 mg by mouth once daily.    tiZANidine (ZANAFLEX) 4 MG tablet Take 8 mg by mouth nightly.    valsartan (DIOVAN) 40 MG tablet Take 40 mg by mouth every morning.    zoledronic acid-mannitol & water (RECLAST) 5 mg/100 mL PgBk 5 mg intravenously     Current Facility-Administered Medications   Medication    hylan g-f 20 (SYNVISC ONE) 48 mg/6 mL injection 48 mg       Review of Systems:    Constitution:   Denies chills, fever, and sweats.  HENT:   Denies headaches or blurry vision.  Cardiovascular:  Denies chest pain or irregular heart beat.  Respiratory:   Denies cough or shortness of breath.  Gastrointestinal:  Denies abdominal pain, nausea, or vomiting.  Musculoskeletal:   Denies muscle cramps.  Neurological:   Denies dizziness or focal weakness.  Psychiatric/Behavior: Normal mental status.  Hematology/Lymph:  Denies bleeding problem or easy bruising/bleeding.  Skin:    Denies rash or suspicious lesions.    Examination:    Vital Signs:    Vitals:    04/09/25 0804   BP: (!) 156/81   Pulse: (!) 54   Weight: 99.8 kg (220 lb 0.3 oz)   Height: 5' 9" (1.753 m)       Body mass index is 32.49 kg/m².    Constitution:   Well-developed, well nourished patient in no acute " distress.  Neurological:   Alert and oriented x 3 and cooperative to examination.     Psychiatric/Behavior: Normal mental status.  Respiratory:   No shortness of breath.  Eyes:    Extraoccular muscles intact  Skin:    No scars, rash or suspicious lesions.    Physical Exam:   Left wrist exam  No obvious deformity.  Negative tenderness over distal radius.  Supination and pronation to 90 degrees and 90 degrees, respectively.  Wrist flexion to 90 degrees and wrist extension to 70 degree.  Positive Tinel's test.  Negative Finkelstein's test.  5/5 strength, normal skin appearance and palpable pulses  Large palpable flexor tendon nodule of the thumb.    Triggering of the thumb.    Decreased light touch sensation in the median nerve distribution.    Negative Tinel at the elbow       Assessment: Left hand pain  -     X-Ray Hand 3 view Left; Future; Expected date: 04/09/2025         Plan:    Assessment & Plan    CARPAL TUNNEL SYNDROME, RIGHT UPPER LIMB:  - Reynaldo understands the risks and benefits and elects to proceed with outpatient carpal tunnel release and cyst removal surgery.  - Surgery to be performed under nerve block.  - Referred to Dr. Fox for right upper extremity carpal tunnel syndrome and tendon nodule.    GANGLION, RIGHT HAND/WRIST:  - Reynaldo understands the risks and benefits and elects to proceed with outpatient carpal tunnel release and cyst removal surgery.  - Surgery to be performed under nerve block.  - Referred to Dr. Fox for right upper extremity carpal tunnel syndrome and tendon nodule.                This note was generated with the assistance of ambient listening technology. Verbal consent was obtained by the patient and accompanying visitor(s) for the recording of patient appointment to facilitate this note. I attest to having reviewed and edited the generated note for accuracy, though some syntax or spelling errors may persist. Please contact the author of this note for any clarification.        DISCLAIMER: This note may have been dictated using voice recognition software and may contain grammatical errors.     NOTE: Consult report sent to referring provider via [employer].

## 2025-04-21 ENCOUNTER — OFFICE VISIT (OUTPATIENT)
Dept: ORTHOPEDICS | Facility: CLINIC | Age: 65
End: 2025-04-21
Payer: COMMERCIAL

## 2025-04-21 VITALS
HEART RATE: 53 BPM | DIASTOLIC BLOOD PRESSURE: 81 MMHG | SYSTOLIC BLOOD PRESSURE: 183 MMHG | HEIGHT: 69 IN | WEIGHT: 223 LBS | BODY MASS INDEX: 33.03 KG/M2

## 2025-04-21 DIAGNOSIS — G56.03 BILATERAL CARPAL TUNNEL SYNDROME: ICD-10-CM

## 2025-04-21 DIAGNOSIS — M65.312 TRIGGER FINGER OF LEFT THUMB: Primary | ICD-10-CM

## 2025-04-21 DIAGNOSIS — M67.40 GANGLION CYST: ICD-10-CM

## 2025-04-21 PROCEDURE — 3079F DIAST BP 80-89 MM HG: CPT | Mod: CPTII,,, | Performed by: ORTHOPAEDIC SURGERY

## 2025-04-21 PROCEDURE — 4010F ACE/ARB THERAPY RXD/TAKEN: CPT | Mod: CPTII,,, | Performed by: ORTHOPAEDIC SURGERY

## 2025-04-21 PROCEDURE — 1159F MED LIST DOCD IN RCRD: CPT | Mod: CPTII,,, | Performed by: ORTHOPAEDIC SURGERY

## 2025-04-21 PROCEDURE — 3077F SYST BP >= 140 MM HG: CPT | Mod: CPTII,,, | Performed by: ORTHOPAEDIC SURGERY

## 2025-04-21 PROCEDURE — 3008F BODY MASS INDEX DOCD: CPT | Mod: CPTII,,, | Performed by: ORTHOPAEDIC SURGERY

## 2025-04-21 PROCEDURE — 99213 OFFICE O/P EST LOW 20 MIN: CPT | Mod: ,,, | Performed by: ORTHOPAEDIC SURGERY

## 2025-04-21 PROCEDURE — 1160F RVW MEDS BY RX/DR IN RCRD: CPT | Mod: CPTII,,, | Performed by: ORTHOPAEDIC SURGERY

## 2025-04-30 NOTE — PROGRESS NOTES
"Subjective:    CC: Finger Pain of the Left Hand (L finger numbness and pain. Pt states he has numbness in thumb and index finger. Has seen Reg and he thinks it's carpal tunnel. Pt states he's seen Dr. Barber for a cyst on his thumb that has come back. Ring finger sometimes has numbness. Not able to pick many things up. Able to move fingers well. No other concerns with it.)       HPI:  Patient comes in today complaining of left hand numbness and tingling.  States it is numb in his thumb and 2nd finger.  Patient had questionable history of carpal tunnel.  He has also had a cyst on his thumb that keeps coming back.  He has had previous treatment, with Dr. Barber.    ROS: Refer to HPI for pertinent ROS. All other 12 point systems negative.    Objective:  Vitals:    04/21/25 0806   BP: (!) 183/81  Comment: Pt hasn't taken BP meds yet   BP Location: Left arm   Patient Position: Sitting   Pulse: (!) 53   Weight: 101.2 kg (223 lb)   Height: 5' 9" (1.753 m)        Physical Exam:  Left upper extremity compartment soft and warm.  Skin is intact.  There is no signs or symptoms of DVT or infection.  Does have a cystic type mass along the base of the left thumb, questionable triggering, tenderness along the A1 pulley region.  He is also having numbness and tingling about the left hand positive Tinel's questionable Phalen's.  There was no obvious thenar hypothenar wasting, lysed neurovascular intact distally.    Images: . Images Reviewed and discussed with patient.    Assessment:  1. Trigger finger of left thumb    2. Ganglion cyst    3. Bilateral carpal tunnel syndrome        Plan:  At this time we discussed his physical exam and previous imaging findings.  We have discussed various treatment options going forward.  We have discussed anti-inflammatories with appropriate precautions, injections splinting physical therapy.  We have discussed his cystic mass along the left thumb.  We have discussed a nerve conduction study, we " will set this up at his convenience.    Follow UP: No follow-ups on file.

## 2025-05-05 ENCOUNTER — LAB VISIT (OUTPATIENT)
Dept: LAB | Facility: HOSPITAL | Age: 65
End: 2025-05-05
Attending: INTERNAL MEDICINE
Payer: COMMERCIAL

## 2025-05-05 DIAGNOSIS — R73.9 HYPERGLYCEMIA: ICD-10-CM

## 2025-05-05 DIAGNOSIS — R80.9 PROTEINURIA, UNSPECIFIED TYPE: ICD-10-CM

## 2025-05-05 DIAGNOSIS — E87.8 ELECTROLYTE IMBALANCE: ICD-10-CM

## 2025-05-05 DIAGNOSIS — I50.9 CONGESTIVE HEART FAILURE, UNSPECIFIED HF CHRONICITY, UNSPECIFIED HEART FAILURE TYPE: Primary | ICD-10-CM

## 2025-05-05 DIAGNOSIS — E55.9 AVITAMINOSIS D: ICD-10-CM

## 2025-05-05 DIAGNOSIS — I12.9 HYPERTENSIVE KIDNEY DISEASE WITH CHRONIC KIDNEY DISEASE: ICD-10-CM

## 2025-05-05 DIAGNOSIS — E03.9 HYPOTHYROIDISM, UNSPECIFIED TYPE: ICD-10-CM

## 2025-05-05 DIAGNOSIS — N18.9 CHRONIC KIDNEY DISEASE, UNSPECIFIED: ICD-10-CM

## 2025-05-05 DIAGNOSIS — E78.5 HYPERLIPIDEMIA, UNSPECIFIED HYPERLIPIDEMIA TYPE: ICD-10-CM

## 2025-05-05 LAB
25(OH)D3+25(OH)D2 SERPL-MCNC: 40 NG/ML (ref 30–80)
ALBUMIN SERPL-MCNC: 3.9 G/DL (ref 3.4–4.8)
ALBUMIN/GLOB SERPL: 1.2 RATIO (ref 1.1–2)
ALP SERPL-CCNC: 65 UNIT/L (ref 40–150)
ALT SERPL-CCNC: 29 UNIT/L (ref 0–55)
ANION GAP SERPL CALC-SCNC: 10 MEQ/L
AST SERPL-CCNC: 22 UNIT/L (ref 11–45)
BACTERIA #/AREA URNS AUTO: ABNORMAL /HPF
BILIRUB SERPL-MCNC: 0.8 MG/DL
BILIRUB UR QL STRIP.AUTO: NEGATIVE
BNP BLD-MCNC: 73.8 PG/ML
BUN SERPL-MCNC: 9.2 MG/DL (ref 8.4–25.7)
CALCIUM SERPL-MCNC: 8.8 MG/DL (ref 8.8–10)
CHLORIDE SERPL-SCNC: 104 MMOL/L (ref 98–107)
CHOLEST SERPL-MCNC: 107 MG/DL
CHOLEST/HDLC SERPL: 3 {RATIO} (ref 0–5)
CK SERPL-CCNC: 210 U/L (ref 30–200)
CLARITY UR: CLEAR
CO2 SERPL-SCNC: 29 MMOL/L (ref 23–31)
COLOR UR AUTO: ABNORMAL
CREAT SERPL-MCNC: 1.45 MG/DL (ref 0.72–1.25)
CREAT UR-MCNC: 213.8 MG/DL (ref 63–166)
CREAT/UREA NIT SERPL: 6
ERYTHROCYTE [DISTWIDTH] IN BLOOD BY AUTOMATED COUNT: 14.1 % (ref 11.5–17)
EST. AVERAGE GLUCOSE BLD GHB EST-MCNC: 116.9 MG/DL
GFR SERPLBLD CREATININE-BSD FMLA CKD-EPI: 54 ML/MIN/1.73/M2
GLOBULIN SER-MCNC: 3.2 GM/DL (ref 2.4–3.5)
GLUCOSE SERPL-MCNC: 86 MG/DL (ref 82–115)
GLUCOSE UR QL STRIP: NORMAL
HBA1C MFR BLD: 5.7 %
HCT VFR BLD AUTO: 43.7 % (ref 42–52)
HDLC SERPL-MCNC: 39 MG/DL (ref 35–60)
HGB BLD-MCNC: 14 G/DL (ref 14–18)
HGB UR QL STRIP: ABNORMAL
KETONES UR QL STRIP: NEGATIVE
LDLC SERPL CALC-MCNC: 54 MG/DL (ref 50–140)
LEUKOCYTE ESTERASE UR QL STRIP: NEGATIVE
MAGNESIUM SERPL-MCNC: 1.6 MG/DL (ref 1.6–2.6)
MCH RBC QN AUTO: 25.8 PG (ref 27–31)
MCHC RBC AUTO-ENTMCNC: 32 G/DL (ref 33–36)
MCV RBC AUTO: 80.5 FL (ref 80–94)
MUCOUS THREADS URNS QL MICRO: ABNORMAL /LPF
NITRITE UR QL STRIP: NEGATIVE
NRBC BLD AUTO-RTO: 0 %
PH UR STRIP: 7 [PH]
PLATELET # BLD AUTO: 233 X10(3)/MCL (ref 130–400)
PMV BLD AUTO: 8.8 FL (ref 7.4–10.4)
POTASSIUM SERPL-SCNC: 3.7 MMOL/L (ref 3.5–5.1)
PROT SERPL-MCNC: 7.1 GM/DL (ref 5.8–7.6)
PROT UR QL STRIP: ABNORMAL
PROT UR STRIP-MCNC: 85.3 MG/DL
RBC # BLD AUTO: 5.43 X10(6)/MCL (ref 4.7–6.1)
RBC #/AREA URNS AUTO: ABNORMAL /HPF
SODIUM SERPL-SCNC: 143 MMOL/L (ref 136–145)
SP GR UR STRIP.AUTO: 1.02 (ref 1–1.03)
SQUAMOUS #/AREA URNS LPF: ABNORMAL /HPF
TRIGL SERPL-MCNC: 70 MG/DL (ref 34–140)
TSH SERPL-ACNC: 1.24 UIU/ML (ref 0.35–4.94)
URATE SERPL-MCNC: 7.9 MG/DL (ref 3.5–7.2)
URINE PROTEIN/CREATININE RATIO (OLG): 0.4
UROBILINOGEN UR STRIP-ACNC: NORMAL
VLDLC SERPL CALC-MCNC: 14 MG/DL
WBC # BLD AUTO: 5.97 X10(3)/MCL (ref 4.5–11.5)
WBC #/AREA URNS AUTO: ABNORMAL /HPF

## 2025-05-05 PROCEDURE — 85027 COMPLETE CBC AUTOMATED: CPT

## 2025-05-05 PROCEDURE — 80053 COMPREHEN METABOLIC PANEL: CPT

## 2025-05-05 PROCEDURE — 81001 URINALYSIS AUTO W/SCOPE: CPT

## 2025-05-05 PROCEDURE — 83735 ASSAY OF MAGNESIUM: CPT

## 2025-05-05 PROCEDURE — 83036 HEMOGLOBIN GLYCOSYLATED A1C: CPT

## 2025-05-05 PROCEDURE — 84443 ASSAY THYROID STIM HORMONE: CPT

## 2025-05-05 PROCEDURE — 82550 ASSAY OF CK (CPK): CPT

## 2025-05-05 PROCEDURE — 36415 COLL VENOUS BLD VENIPUNCTURE: CPT

## 2025-05-05 PROCEDURE — 84156 ASSAY OF PROTEIN URINE: CPT

## 2025-05-05 PROCEDURE — 83880 ASSAY OF NATRIURETIC PEPTIDE: CPT

## 2025-05-05 PROCEDURE — 80061 LIPID PANEL: CPT

## 2025-05-05 PROCEDURE — 82306 VITAMIN D 25 HYDROXY: CPT

## 2025-05-05 PROCEDURE — 84550 ASSAY OF BLOOD/URIC ACID: CPT

## 2025-05-06 DIAGNOSIS — G56.03 BILATERAL CARPAL TUNNEL SYNDROME: Primary | ICD-10-CM

## 2025-05-15 ENCOUNTER — OFFICE VISIT (OUTPATIENT)
Dept: ORTHOPEDICS | Facility: CLINIC | Age: 65
End: 2025-05-15
Payer: COMMERCIAL

## 2025-05-15 VITALS
HEART RATE: 77 BPM | DIASTOLIC BLOOD PRESSURE: 84 MMHG | SYSTOLIC BLOOD PRESSURE: 180 MMHG | BODY MASS INDEX: 33.05 KG/M2 | WEIGHT: 223.13 LBS | TEMPERATURE: 98 F | HEIGHT: 69 IN

## 2025-05-15 DIAGNOSIS — G56.22 CUBITAL TUNNEL SYNDROME, LEFT: ICD-10-CM

## 2025-05-15 DIAGNOSIS — G56.03 BILATERAL CARPAL TUNNEL SYNDROME: Primary | ICD-10-CM

## 2025-05-15 DIAGNOSIS — Z01.818 PREOP TESTING: ICD-10-CM

## 2025-05-15 DIAGNOSIS — M67.40 GANGLION CYST: ICD-10-CM

## 2025-05-15 DIAGNOSIS — M65.312 TRIGGER FINGER OF LEFT THUMB: ICD-10-CM

## 2025-05-15 PROCEDURE — 3008F BODY MASS INDEX DOCD: CPT | Mod: CPTII,,, | Performed by: ORTHOPAEDIC SURGERY

## 2025-05-15 PROCEDURE — 3044F HG A1C LEVEL LT 7.0%: CPT | Mod: CPTII,,, | Performed by: ORTHOPAEDIC SURGERY

## 2025-05-15 PROCEDURE — 4010F ACE/ARB THERAPY RXD/TAKEN: CPT | Mod: CPTII,,, | Performed by: ORTHOPAEDIC SURGERY

## 2025-05-15 PROCEDURE — 99214 OFFICE O/P EST MOD 30 MIN: CPT | Mod: ,,, | Performed by: ORTHOPAEDIC SURGERY

## 2025-05-15 PROCEDURE — 3079F DIAST BP 80-89 MM HG: CPT | Mod: CPTII,,, | Performed by: ORTHOPAEDIC SURGERY

## 2025-05-15 PROCEDURE — 3077F SYST BP >= 140 MM HG: CPT | Mod: CPTII,,, | Performed by: ORTHOPAEDIC SURGERY

## 2025-05-15 PROCEDURE — 1159F MED LIST DOCD IN RCRD: CPT | Mod: CPTII,,, | Performed by: ORTHOPAEDIC SURGERY

## 2025-05-15 PROCEDURE — 1160F RVW MEDS BY RX/DR IN RCRD: CPT | Mod: CPTII,,, | Performed by: ORTHOPAEDIC SURGERY

## 2025-05-15 RX ORDER — SODIUM CHLORIDE, SODIUM GLUCONATE, SODIUM ACETATE, POTASSIUM CHLORIDE AND MAGNESIUM CHLORIDE 30; 37; 368; 526; 502 MG/100ML; MG/100ML; MG/100ML; MG/100ML; MG/100ML
INJECTION, SOLUTION INTRAVENOUS CONTINUOUS
OUTPATIENT
Start: 2025-05-15

## 2025-05-15 NOTE — LETTER
Surgery Clearance Request Form  Patients Name: Reynaldo Patton  : 1960  Today's Date: 2025     Dr. Childress ,        The above named patient is scheduled to have a left thumb trigger finger release, left wrist carpal tunnel release, and left elbow cubital tunnel release on 25.    Surgeon: Luis Fox MD  Type of Anesthesia: Local /Axillary block  Requesting Staff Name: Agustina Helton LPN    This patient needs surgical clearance from your office for this procedure.    Please perform necessary tests in order for this patient to be medically cleared for surgery.     PLEASE FAX THE CLEARANCE LETTER WITH THE MOST RECENT DIAGNOSTICS AND PROGRESS NOTES TO US AT [phone].  PLEASE BE SURE TO INCLUDE ANY MEDICATION INSTRUCTIONS THAT SHOULD BE HELD PRIOR TO SURGERY.     CHECK ALL THAT APPLY AND COMPLETE THE BLANKS:   [x] Request for cardiac risk assessment for procedure stated above.   (Please fax us the risk assessment/clearance letter with the most recent ECHO, EKG or stress test.)   [x] Medication instructions prior to surgery.   (Please specify if you recommend the patient stop any medications prior to surgery and how many days prior to surgery.)   [] Request for  clearance for the procedure stated above.   (Please fax us the clearance letter with the most recent diagnostics and progress notes.)    We appreciate your help in getting our patient cleared for surgery.    Please feel free to call our office should you have any questions.     Thank you,   Luis Fox MD     Phone Number: [phone]  Fax Number: [phone]

## 2025-05-19 NOTE — PROGRESS NOTES
"Subjective:    CC: Results (Patient is here for NCS result. )       HPI:  Patient returns today for repeat exam.  Patient continues to have numbness and tingling in both hands.  He also complains of the soft tissue mass about the left thumb, he does complain of triggering of the thumb itself.  He has not improve with conservative treatment, continues to have worsening symptoms with daily activities.  He did have a nerve conduction study, we have discussed his results.    ROS: Refer to [facility] for pertinent ROS. All other 12 point systems negative.    Objective:  Vitals:    05/15/25 1558   BP: (!) 180/84   BP Location: Left arm   Patient Position: Sitting   Pulse: 77   Temp: 98 °F (36.7 °C)   Weight: 101.2 kg (223 lb 1.7 oz)   Height: 5' 9" (1.753 m)        Physical Exam:  Bilateral upper extremities compartment soft and warm.  Skin is intact.  There is no signs or symptoms of DVT or infection.  Positive Tinel and Phalen's at the carpal tunnel.  There was no obvious thenar hypothenar wasting.  He is point tender along the A1 pulley left thumb there is triggering with flexion extension, suspected ganglion cyst in his area as well.  He also has a Tinel's at the left elbow, neurovascular intact distally    Images: . Images Reviewed and discussed with patient.    Assessment:  1. Bilateral carpal tunnel syndrome  - Place in Outpatient; Standing  - Vital signs; Standing  - Insert peripheral IV; Standing  - Clip and Prep Other (please specifiy) (Operative site); Standing  - Cleanse with Chlorhexidine (CHG); Standing  - Diet NPO; Standing  - electrolyte-A infusion  - IP VTE LOW RISK PATIENT; Standing  - Reason for no VTE Prophylaxis; Standing  - ceFAZolin (Ancef) 2 g in D5W 50 mL IVPB  - CBC auto differential; Future  - Comprehensive metabolic panel; Future  - EKG 12-lead; Future  - Inpatient consult to Anesthesiology; Standing  - Case Request Operating Room: RELEASE, TRIGGER FINGER, RELEASE, CARPAL TUNNEL, RELEASE, CUBITAL " TUNNEL  - Full code; Standing    2. Trigger finger of left thumb  - Place in Outpatient; Standing  - Vital signs; Standing  - Insert peripheral IV; Standing  - Clip and Prep Other (please specifiy) (Operative site); Standing  - Cleanse with Chlorhexidine (CHG); Standing  - Diet NPO; Standing  - electrolyte-A infusion  - IP VTE LOW RISK PATIENT; Standing  - Reason for no VTE Prophylaxis; Standing  - ceFAZolin (Ancef) 2 g in D5W 50 mL IVPB  - CBC auto differential; Future  - Comprehensive metabolic panel; Future  - EKG 12-lead; Future  - Inpatient consult to Anesthesiology; Standing  - Case Request Operating Room: RELEASE, TRIGGER FINGER, RELEASE, CARPAL TUNNEL, RELEASE, CUBITAL TUNNEL  - Full code; Standing    3. Ganglion cyst    4. Cubital tunnel syndrome, left  - Place in Outpatient; Standing  - Vital signs; Standing  - Insert peripheral IV; Standing  - Clip and Prep Other (please specifiy) (Operative site); Standing  - Cleanse with Chlorhexidine (CHG); Standing  - Diet NPO; Standing  - electrolyte-A infusion  - IP VTE LOW RISK PATIENT; Standing  - Reason for no VTE Prophylaxis; Standing  - ceFAZolin (Ancef) 2 g in D5W 50 mL IVPB  - CBC auto differential; Future  - Comprehensive metabolic panel; Future  - EKG 12-lead; Future  - Inpatient consult to Anesthesiology; Standing  - Case Request Operating Room: RELEASE, TRIGGER FINGER, RELEASE, CARPAL TUNNEL, RELEASE, CUBITAL TUNNEL  - Full code; Standing    5. Preop testing  - Place in Outpatient; Standing  - Vital signs; Standing  - Insert peripheral IV; Standing  - Clip and Prep Other (please specifiy) (Operative site); Standing  - Cleanse with Chlorhexidine (CHG); Standing  - Diet NPO; Standing  - electrolyte-A infusion  - IP VTE LOW RISK PATIENT; Standing  - Reason for no VTE Prophylaxis; Standing  - ceFAZolin (Ancef) 2 g in D5W 50 mL IVPB  - CBC auto differential; Future  - Comprehensive metabolic panel; Future  - EKG 12-lead; Future  - Inpatient consult to  Anesthesiology; Standing  - Case Request Operating Room: RELEASE, TRIGGER FINGER, RELEASE, CARPAL TUNNEL, RELEASE, CUBITAL TUNNEL  - Full code; Standing        Plan:  At this time we discussed his physical exam and nerve conduction findings.  We have discussed various treatment options going forward including conservative treatment and surgical intervention.  Like to proceed with trigger thumb release of the left thumb, carpal and cubital tunnel release left upper extremity.  Risks benefits and alternatives discussed with the patient in detail.  All questions were answered.  We have discussed the down time rehab process afterwards.  We will set this up at his convenience.    Follow UP: No follow-ups on file.

## 2025-06-06 ENCOUNTER — LAB VISIT (OUTPATIENT)
Dept: LAB | Facility: HOSPITAL | Age: 65
End: 2025-06-06
Attending: INTERNAL MEDICINE
Payer: COMMERCIAL

## 2025-06-06 DIAGNOSIS — N18.9 CHRONIC KIDNEY DISEASE, UNSPECIFIED: Primary | ICD-10-CM

## 2025-06-06 DIAGNOSIS — E87.8 ELECTROLYTE IMBALANCE: ICD-10-CM

## 2025-06-06 DIAGNOSIS — I12.9 PARENCHYMAL RENAL HYPERTENSION: ICD-10-CM

## 2025-06-06 LAB
ANION GAP SERPL CALC-SCNC: 8 MEQ/L
BUN SERPL-MCNC: 14.4 MG/DL (ref 8.4–25.7)
CALCIUM SERPL-MCNC: 8.2 MG/DL (ref 8.8–10)
CHLORIDE SERPL-SCNC: 108 MMOL/L (ref 98–107)
CO2 SERPL-SCNC: 26 MMOL/L (ref 23–31)
CREAT SERPL-MCNC: 1.5 MG/DL (ref 0.72–1.25)
CREAT/UREA NIT SERPL: 10
GFR SERPLBLD CREATININE-BSD FMLA CKD-EPI: 52 ML/MIN/1.73/M2
GLUCOSE SERPL-MCNC: 95 MG/DL (ref 82–115)
MAGNESIUM SERPL-MCNC: 1.7 MG/DL (ref 1.6–2.6)
POTASSIUM SERPL-SCNC: 3.7 MMOL/L (ref 3.5–5.1)
SODIUM SERPL-SCNC: 142 MMOL/L (ref 136–145)

## 2025-06-06 PROCEDURE — 82610 CYSTATIN C: CPT

## 2025-06-06 PROCEDURE — 83735 ASSAY OF MAGNESIUM: CPT

## 2025-06-06 PROCEDURE — 80048 BASIC METABOLIC PNL TOTAL CA: CPT

## 2025-06-06 PROCEDURE — 36415 COLL VENOUS BLD VENIPUNCTURE: CPT

## 2025-06-07 LAB
CYSTATIN C SERPL-MCNC: 0.92 MG/L (ref 0.67–1.21)
GFR/BSA.PRED SERPLBLD CYS-BASED-ARV: 85 ML/MIN/BSA

## 2025-06-23 ENCOUNTER — ANESTHESIA EVENT (OUTPATIENT)
Dept: SURGERY | Facility: HOSPITAL | Age: 65
End: 2025-06-23
Payer: COMMERCIAL

## 2025-06-25 RX ORDER — DAPAGLIFLOZIN 10 MG/1
10 TABLET, FILM COATED ORAL DAILY
COMMUNITY
Start: 2025-05-21

## 2025-06-25 RX ORDER — FUROSEMIDE 20 MG/1
20 TABLET ORAL DAILY
COMMUNITY
Start: 2025-05-22

## 2025-06-25 RX ORDER — ERGOCALCIFEROL 1.25 MG/1
50000 CAPSULE ORAL
COMMUNITY
Start: 2025-05-30

## 2025-06-30 NOTE — H&P
Admission History & Physical    Subjective:    CC: No chief complaint on file.       HPI:  Reynaldo Patton presents today for preoperative evaluation for trigger thumb release left thumb, carpal tunnel release left wrist, cubital tunnel release left elbow. I reviewed the indications for surgery. The risks and benefits of the proposed and alternative treatments were discussed with the patient. Questions pertinent to the procedure were solicited and answered. Dr. Fox was available to answer any questions with instruction to call clinic with any further questions.No assurances were given. Informed consent was obtained. The patient expressed good understanding and wished to proceed with scheduling the procedure.     ROS:   Constitutional: No fever, weakness, or fatigue.   Ear/Nose/Mouth/Throat: No nasal congestion or sore throat.   Respiratory: No shortness of breath or cough.   Cardiovascular: No chest pain, palpitations, or peripheral edema.   Gastrointestinal: No nausea, vomiting, or abdominal pain.   Genitourinary: No dysuria.  Musculoskeletal:  Left hand numbness and tingling, triggering left thumb    Past Surgical History:   Procedure Laterality Date    CHOLECYSTECTOMY      COLECTOMY      COLONOSCOPY      CORONARY STENT PLACEMENT  09/2015    JESSICA    LEFT FRONTOTEMPORAL CRANIOTOMY FOR EXCISION FOR MENINGIOMA  06/24/2014    PRUDENCIO    LEFT FRONTOTEMPORAL RE-EXPLORATION & REMOVAL OF SCREW/PLATE  10/25/2016    PRUDENCIO        Past Medical History:   Diagnosis Date    Coronary artery disease     Crohn's disease     Diabetes mellitus     borderline    GERD (gastroesophageal reflux disease)     Hypercholesterolemia     Hypertension     Sleep apnea     cpap    Trigger finger         Objective:    There were no vitals filed for this visit.     Physical Exam:    Appearance: No distress, good color on room air. Alert and cooperative.  HEENT: Normocephalic. PERRLA EOM intact.   Lungs: Breathing unlabored.  Heart:  Regular rate and rhythm.  Abdomen: Soft, non-tender.  No rebound tenderness.  Extremities: Bilateral upper extremities compartment soft and warm. Skin is intact. There is no signs or symptoms of DVT or infection. Positive Tinel and Phalen's at the carpal tunnel. There was no obvious thenar hypothenar wasting. He is point tender along the A1 pulley left thumb there is triggering with flexion extension, suspected ganglion cyst in his area as well. He also has a Tinel's at the left elbow, neurovascular intact distally  Skin: No rashes or open wounds.        Assessment:  Carpal tunnel syndrome left wrist, cubital tunnel syndrome left elbow, trigger thumb left thumb    Plan:  Plan for carpal tunnel release left wrist, cubital tunnel release left elbow and trigger thumb release left thumb at O. The patient has been given preoperative instructions and prescriptions for post-operative medication. Post-operative appointment is scheduled for 2 weeks.

## 2025-07-01 ENCOUNTER — HOSPITAL ENCOUNTER (OUTPATIENT)
Facility: HOSPITAL | Age: 65
Discharge: HOME | End: 2025-07-01
Attending: ORTHOPAEDIC SURGERY | Admitting: ORTHOPAEDIC SURGERY
Payer: COMMERCIAL

## 2025-07-01 ENCOUNTER — ANESTHESIA (OUTPATIENT)
Dept: SURGERY | Facility: HOSPITAL | Age: 65
End: 2025-07-01
Payer: COMMERCIAL

## 2025-07-01 VITALS
BODY MASS INDEX: 32.33 KG/M2 | TEMPERATURE: 97 F | RESPIRATION RATE: 20 BRPM | WEIGHT: 218.25 LBS | SYSTOLIC BLOOD PRESSURE: 149 MMHG | DIASTOLIC BLOOD PRESSURE: 80 MMHG | HEART RATE: 55 BPM | HEIGHT: 69 IN | OXYGEN SATURATION: 96 %

## 2025-07-01 DIAGNOSIS — G56.03 BILATERAL CARPAL TUNNEL SYNDROME: Primary | ICD-10-CM

## 2025-07-01 DIAGNOSIS — M65.312 TRIGGER THUMB OF LEFT HAND: ICD-10-CM

## 2025-07-01 DIAGNOSIS — Z01.818 PREOP TESTING: ICD-10-CM

## 2025-07-01 DIAGNOSIS — M65.312 TRIGGER FINGER OF LEFT THUMB: ICD-10-CM

## 2025-07-01 DIAGNOSIS — Z01.818 PRE-OP EXAM: ICD-10-CM

## 2025-07-01 DIAGNOSIS — G56.22 CUBITAL TUNNEL SYNDROME, LEFT: ICD-10-CM

## 2025-07-01 LAB
OHS QRS DURATION: 94 MS
OHS QTC CALCULATION: 424 MS

## 2025-07-01 PROCEDURE — 63600175 PHARM REV CODE 636 W HCPCS: Mod: JZ,TB

## 2025-07-01 PROCEDURE — 26160 REMOVE TENDON SHEATH LESION: CPT | Mod: 51,FA,, | Performed by: ORTHOPAEDIC SURGERY

## 2025-07-01 PROCEDURE — 64415 NJX AA&/STRD BRCH PLXS IMG: CPT | Performed by: STUDENT IN AN ORGANIZED HEALTH CARE EDUCATION/TRAINING PROGRAM

## 2025-07-01 PROCEDURE — 37000008 HC ANESTHESIA 1ST 15 MINUTES: Performed by: ORTHOPAEDIC SURGERY

## 2025-07-01 PROCEDURE — 63600175 PHARM REV CODE 636 W HCPCS: Performed by: STUDENT IN AN ORGANIZED HEALTH CARE EDUCATION/TRAINING PROGRAM

## 2025-07-01 PROCEDURE — 36000707: Performed by: ORTHOPAEDIC SURGERY

## 2025-07-01 PROCEDURE — 64721 CARPAL TUNNEL SURGERY: CPT | Mod: 51,LT,, | Performed by: ORTHOPAEDIC SURGERY

## 2025-07-01 PROCEDURE — 71000015 HC POSTOP RECOV 1ST HR: Performed by: ORTHOPAEDIC SURGERY

## 2025-07-01 PROCEDURE — 82962 GLUCOSE BLOOD TEST: CPT | Performed by: ORTHOPAEDIC SURGERY

## 2025-07-01 PROCEDURE — 93005 ELECTROCARDIOGRAM TRACING: CPT

## 2025-07-01 PROCEDURE — 71000033 HC RECOVERY, INTIAL HOUR: Performed by: ORTHOPAEDIC SURGERY

## 2025-07-01 PROCEDURE — 25000003 PHARM REV CODE 250

## 2025-07-01 PROCEDURE — 63600175 PHARM REV CODE 636 W HCPCS

## 2025-07-01 PROCEDURE — 64718 REVISE ULNAR NERVE AT ELBOW: CPT | Mod: LT,,, | Performed by: ORTHOPAEDIC SURGERY

## 2025-07-01 PROCEDURE — 36000706: Performed by: ORTHOPAEDIC SURGERY

## 2025-07-01 PROCEDURE — 93010 ELECTROCARDIOGRAM REPORT: CPT | Mod: ,,, | Performed by: INTERNAL MEDICINE

## 2025-07-01 PROCEDURE — 71000016 HC POSTOP RECOV ADDL HR: Performed by: ORTHOPAEDIC SURGERY

## 2025-07-01 PROCEDURE — 37000009 HC ANESTHESIA EA ADD 15 MINS: Performed by: ORTHOPAEDIC SURGERY

## 2025-07-01 RX ORDER — LIDOCAINE HYDROCHLORIDE 20 MG/ML
INJECTION INTRAVENOUS
Status: DISCONTINUED | OUTPATIENT
Start: 2025-07-01 | End: 2025-07-01

## 2025-07-01 RX ORDER — SODIUM CHLORIDE, SODIUM GLUCONATE, SODIUM ACETATE, POTASSIUM CHLORIDE AND MAGNESIUM CHLORIDE 30; 37; 368; 526; 502 MG/100ML; MG/100ML; MG/100ML; MG/100ML; MG/100ML
INJECTION, SOLUTION INTRAVENOUS CONTINUOUS
Status: DISCONTINUED | OUTPATIENT
Start: 2025-07-01 | End: 2025-07-01 | Stop reason: HOSPADM

## 2025-07-01 RX ORDER — METOCLOPRAMIDE HYDROCHLORIDE 5 MG/ML
10 INJECTION INTRAMUSCULAR; INTRAVENOUS EVERY 6 HOURS PRN
Status: DISCONTINUED | OUTPATIENT
Start: 2025-07-01 | End: 2025-07-01 | Stop reason: HOSPADM

## 2025-07-01 RX ORDER — ALUMINUM HYDROXIDE, MAGNESIUM HYDROXIDE, AND SIMETHICONE 1200; 120; 1200 MG/30ML; MG/30ML; MG/30ML
30 SUSPENSION ORAL EVERY 6 HOURS PRN
Status: DISCONTINUED | OUTPATIENT
Start: 2025-07-01 | End: 2025-07-01 | Stop reason: HOSPADM

## 2025-07-01 RX ORDER — DEXAMETHASONE SODIUM PHOSPHATE 4 MG/ML
INJECTION, SOLUTION INTRA-ARTICULAR; INTRALESIONAL; INTRAMUSCULAR; INTRAVENOUS; SOFT TISSUE
Status: DISCONTINUED | OUTPATIENT
Start: 2025-07-01 | End: 2025-07-01

## 2025-07-01 RX ORDER — MORPHINE SULFATE 4 MG/ML
4 INJECTION, SOLUTION INTRAMUSCULAR; INTRAVENOUS
Refills: 0 | Status: DISCONTINUED | OUTPATIENT
Start: 2025-07-01 | End: 2025-07-01 | Stop reason: HOSPADM

## 2025-07-01 RX ORDER — ONDANSETRON HYDROCHLORIDE 2 MG/ML
4 INJECTION, SOLUTION INTRAVENOUS EVERY 6 HOURS PRN
Status: DISCONTINUED | OUTPATIENT
Start: 2025-07-01 | End: 2025-07-01 | Stop reason: HOSPADM

## 2025-07-01 RX ORDER — MIDAZOLAM HYDROCHLORIDE 2 MG/2ML
INJECTION, SOLUTION INTRAMUSCULAR; INTRAVENOUS
Status: COMPLETED
Start: 2025-07-01 | End: 2025-07-01

## 2025-07-01 RX ORDER — ONDANSETRON HYDROCHLORIDE 2 MG/ML
INJECTION, SOLUTION INTRAVENOUS
Status: DISCONTINUED | OUTPATIENT
Start: 2025-07-01 | End: 2025-07-01

## 2025-07-01 RX ORDER — SODIUM CHLORIDE 9 MG/ML
INJECTION, SOLUTION INTRAVENOUS CONTINUOUS
Status: DISCONTINUED | OUTPATIENT
Start: 2025-07-01 | End: 2025-07-01 | Stop reason: HOSPADM

## 2025-07-01 RX ORDER — METHOCARBAMOL 500 MG/1
500 TABLET, FILM COATED ORAL EVERY 6 HOURS PRN
Status: DISCONTINUED | OUTPATIENT
Start: 2025-07-01 | End: 2025-07-01 | Stop reason: HOSPADM

## 2025-07-01 RX ORDER — ROPIVACAINE HYDROCHLORIDE 5 MG/ML
INJECTION, SOLUTION EPIDURAL; INFILTRATION; PERINEURAL
Status: COMPLETED
Start: 2025-07-01 | End: 2025-07-01

## 2025-07-01 RX ORDER — ROPIVACAINE HYDROCHLORIDE 5 MG/ML
INJECTION, SOLUTION EPIDURAL; INFILTRATION; PERINEURAL
Status: COMPLETED | OUTPATIENT
Start: 2025-07-01 | End: 2025-07-01

## 2025-07-01 RX ORDER — PROPOFOL 10 MG/ML
INJECTION, EMULSION INTRAVENOUS
Status: DISCONTINUED | OUTPATIENT
Start: 2025-07-01 | End: 2025-07-01

## 2025-07-01 RX ORDER — GLYCOPYRROLATE 0.2 MG/ML
INJECTION INTRAMUSCULAR; INTRAVENOUS
Status: DISCONTINUED | OUTPATIENT
Start: 2025-07-01 | End: 2025-07-01

## 2025-07-01 RX ORDER — CALCIUM CARBONATE 200(500)MG
500 TABLET,CHEWABLE ORAL 3 TIMES DAILY PRN
Status: DISCONTINUED | OUTPATIENT
Start: 2025-07-01 | End: 2025-07-01 | Stop reason: HOSPADM

## 2025-07-01 RX ORDER — HYDROCODONE BITARTRATE AND ACETAMINOPHEN 5; 325 MG/1; MG/1
1 TABLET ORAL EVERY 4 HOURS PRN
Refills: 0 | Status: DISCONTINUED | OUTPATIENT
Start: 2025-07-01 | End: 2025-07-01 | Stop reason: HOSPADM

## 2025-07-01 RX ADMIN — PROPOFOL 200 MG: 10 INJECTION, EMULSION INTRAVENOUS at 02:07

## 2025-07-01 RX ADMIN — ROPIVACAINE HYDROCHLORIDE 30 ML: 5 INJECTION, SOLUTION EPIDURAL; INFILTRATION; PERINEURAL at 02:07

## 2025-07-01 RX ADMIN — ONDANSETRON HYDROCHLORIDE 4 MG: 2 SOLUTION INTRAMUSCULAR; INTRAVENOUS at 02:07

## 2025-07-01 RX ADMIN — SODIUM CHLORIDE, SODIUM GLUCONATE, SODIUM ACETATE, POTASSIUM CHLORIDE AND MAGNESIUM CHLORIDE: 526; 502; 368; 37; 30 INJECTION, SOLUTION INTRAVENOUS at 02:07

## 2025-07-01 RX ADMIN — LIDOCAINE HYDROCHLORIDE 50 MG: 20 INJECTION INTRAVENOUS at 02:07

## 2025-07-01 RX ADMIN — GLYCOPYRROLATE 0.4 MG: 0.2 INJECTION INTRAMUSCULAR; INTRAVENOUS at 02:07

## 2025-07-01 RX ADMIN — MIDAZOLAM HYDROCHLORIDE 2 MG: 1 INJECTION, SOLUTION INTRAMUSCULAR; INTRAVENOUS at 01:07

## 2025-07-01 RX ADMIN — DEXAMETHASONE SODIUM PHOSPHATE 4 MG: 4 INJECTION, SOLUTION INTRA-ARTICULAR; INTRALESIONAL; INTRAMUSCULAR; INTRAVENOUS; SOFT TISSUE at 02:07

## 2025-07-01 NOTE — ANESTHESIA POSTPROCEDURE EVALUATION
Anesthesia Post Evaluation    Patient: Reynaldo Patton    Procedure(s) Performed: Procedure(s) (LRB):  RELEASE, TRIGGER FINGER - block, stretcher (Left)  RELEASE, CARPAL TUNNEL (Left)  RELEASE, CUBITAL TUNNEL (Left)    Final Anesthesia Type: general      Patient location during evaluation: PACU  Patient participation: Yes- Able to Participate  Level of consciousness: awake and alert  Post-procedure vital signs: reviewed and stable  Pain management: adequate  Airway patency: patent    PONV status at discharge: No PONV  Anesthetic complications: no      Respiratory status: unassisted  Hydration status: euvolemic  Follow-up not needed.              Vitals Value Taken Time   /75 07/01/25 15:31   Temp 36.1 °C (97 °F) 07/01/25 15:20   Pulse 59 07/01/25 15:32   Resp 17 07/01/25 15:32   SpO2 94 % 07/01/25 15:32   Vitals shown include unfiled device data.      No case tracking events are documented in the log.      Pain/Edis Score: Edis Score: 9 (7/1/2025  3:30 PM)

## 2025-07-01 NOTE — ANESTHESIA PROCEDURE NOTES
Peripheral Block    Patient location during procedure: pre-op   Block not for primary anesthetic.  Reason for block: at surgeon's request and post-op pain management   Post-op Pain Location: left arm   Start time: 7/1/2025 2:00 PM  Timeout: 7/1/2025 2:00 PM   End time: 7/1/2025 2:03 PM    Staffing  Authorizing Provider: Henrry Antoine MD  Performing Provider: Henrry Antoine MD    Staffing  Performed by: Henrry Antoine MD  Authorized by: Henrry Antoine MD    Preanesthetic Checklist  Completed: patient identified, IV checked, site marked, risks and benefits discussed, surgical consent, monitors and equipment checked, pre-op evaluation and timeout performed  Peripheral Block  Patient position: supine  Prep: ChloraPrep  Patient monitoring: heart rate, cardiac monitor, continuous pulse ox, continuous capnometry and frequent blood pressure checks  Block type: supraclavicular  Laterality: left  Injection technique: single shot  Needle  Needle type: Stimuplex   Needle gauge: 22 G  Needle length: 2 in  Needle localization: anatomical landmarks and ultrasound guidance   -ultrasound image captured on disc.  Assessment  Injection assessment: negative aspiration, negative parasthesia and local visualized surrounding nerve  Paresthesia pain: none  Heart rate change: no  Slow fractionated injection: yes  Pain Tolerance: comfortable throughout block  Medications:    Medications: ropivacaine (NAROPIN) injection 0.5% - Perineural   30 mL - 7/1/2025 2:03:00 PM    Additional Notes  Good view of artery and first rib.  Needle guided adjacent to artery via corner pocket technique and local dosed without paresthesias.  Needle then moved anteriorly to plexus and local again dosed without paresthesias.  No complications.  Good perineural spread noted.  VSS.  [facility] RN monitoring vitals throughout procedure.  Patient tolerated procedure well.

## 2025-07-01 NOTE — TRANSFER OF CARE
"Anesthesia Transfer of Care Note    Patient: Reynaldo Patton    Procedure(s) Performed: Procedure(s) (LRB):  RELEASE, TRIGGER FINGER - block, stretcher (Left)  RELEASE, CARPAL TUNNEL (Left)  RELEASE, CUBITAL TUNNEL (Left)    Patient location: PACU    Anesthesia Type: general    Transport from OR: Transported from OR on room air with adequate spontaneous ventilation    Post pain: adequate analgesia    Post assessment: no apparent anesthetic complications and tolerated procedure well    Post vital signs: stable    Level of consciousness: awake    Nausea/Vomiting: no nausea/vomiting    Complications: none    Transfer of care protocol was followed    Last vitals: Visit Vitals  BP (!) 175/80   Pulse 60   Temp 35.6 °C (96 °F) (Tympanic)   Resp 16   Ht 5' 9" (1.753 m)   Wt 99 kg (218 lb 4.1 oz)   SpO2 99%   BMI 32.23 kg/m²     "

## 2025-07-01 NOTE — ANESTHESIA PROCEDURE NOTES
Intubation    Date/Time: 7/1/2025 2:22 PM    Performed by: Sushil Shane CRNA  Authorized by: Henrry Antoine MD    Intubation:     Induction:  Intravenous    Intubated:  Postinduction    Mask Ventilation:  Easy mask    Attempts:  1    Attempted By:  CRNA    Difficult Airway Encountered?: No      Complications:  None    Airway Device:  Supraglottic airway/LMA    Airway Device Size:  4.0    Style/Cuff Inflation:  Cuffed (inflated to minimal occlusive pressure)    Placement Verified By:  Capnometry    Complicating Factors:  None    Findings Post-Intubation:  BS equal bilateral

## 2025-07-01 NOTE — ANESTHESIA PREPROCEDURE EVALUATION
07/01/2025  Reynaldo Patton is a 65 y.o., male.      Pre-op Assessment    I have reviewed the Patient Summary Reports.     I have reviewed the Nursing Notes. I have reviewed the NPO Status.   I have reviewed the Medications.     Review of Systems  Anesthesia Hx:               Denies Personal Hx of Anesthesia complications.                    Cardiovascular:     Hypertension   CAD  asymptomatic CABG/stent                                       Pulmonary:        Sleep Apnea, CPAP                Hepatic/GI:     GERD, well controlled                Endocrine:  Diabetes         Obesity / BMI > 30      Physical Exam  General: Well nourished, Cooperative and Alert    Airway:  Mallampati: II   Mouth Opening: Normal  TM Distance: Normal  Tongue: Normal    Dental:  Intact    Chest/Lungs:  Normal Respiratory Rate    Heart:  Rate: Normal        Anesthesia Plan  Type of Anesthesia, risks & benefits discussed:    Anesthesia Type: Gen Supraglottic Airway  Intra-op Monitoring Plan: Standard ASA Monitors  Post Op Pain Control Plan: multimodal analgesia  Induction:  IV  Informed Consent: Informed consent signed with the Patient and all parties understand the risks and agree with anesthesia plan.  All questions answered. Patient consented to blood products? Yes  ASA Score: 3  Day of Surgery Review of History & Physical: H&P Update referred to the surgeon/provider.    Ready For Surgery From Anesthesia Perspective.     .

## 2025-07-02 LAB — POCT GLUCOSE: 89 MG/DL (ref 70–110)

## 2025-07-03 NOTE — BRIEF OP NOTE
[facility] - Periop Services  Brief Operative Note    Surgery Date: 7/1/2025     Surgeons and Role:     * Luis Fox MD - Primary    Assisting Surgeon: None    Pre-op Diagnosis:  Bilateral carpal tunnel syndrome [G56.03]  Trigger finger of left thumb [M65.312]  Cubital tunnel syndrome, left [G56.22]  Preop testing [Z01.818]    Post-op Diagnosis:  Post-Op Diagnosis Codes:     * Bilateral carpal tunnel syndrome [G56.03]     * Trigger finger of left thumb [M65.312]     * Cubital tunnel syndrome, left [G56.22]     * Preop testing [Z01.818]    Procedure(s) (LRB):  RELEASE, TRIGGER FINGER - block, stretcher (Left)  RELEASE, CARPAL TUNNEL (Left)  RELEASE, CUBITAL TUNNEL (Left)    Anesthesia: General    Operative Findings: Left carp, cub, trigger thumb    Estimated Blood Loss: * No values recorded between 7/1/2025  2:14 PM and 7/1/2025  3:22 PM *         Specimens:   Specimen (24h ago, onward)      None            * No specimens in log *        Discharge Note    OUTCOME: Patient tolerated treatment/procedure well without complication and is now ready for discharge.    DISPOSITION: Home or Self Care    FINAL DIAGNOSIS:  Bilateral carpal tunnel syndrome    FOLLOWUP: In clinic    DISCHARGE INSTRUCTIONS:    Discharge Procedure Orders   Diet general     Activity as tolerated     Keep surgical extremity elevated     Ice to affected area     Lifting restrictions     No driving, operating heavy equipment or signing legal documents while taking pain medication.     Other restrictions (specify):   Order Comments: Okay to wean sling as tolerated.     Remove dressing in 72 hours     Wound care routine (specify)   Order Comments: Wound care routine: keep dressing clean, dry, and intact. Ok to remove in 3 days. Ok to shower once removed. No submersion.     Call MD for:  temperature >100.4     Call MD for:  persistent nausea and vomiting     Call MD for:  severe uncontrolled pain     Call MD for:  difficulty  breathing, headache or visual disturbances     Call MD for:  redness, tenderness, or signs of infection (pain, swelling, redness, odor or green/yellow discharge around incision site)     Call MD for:  hives     Call MD for:  persistent dizziness or light-headedness     Call MD for:  extreme fatigue     Shower on day dressing removed (No bath)        Clinical Reference Documents Added to Patient Instructions         Document    CARPAL TUNNEL SURGERY - DISCHARGE INSTRUCTIONS (ENGLISH)

## 2025-07-03 NOTE — OP NOTE
DATE OF SURGERY: 7/1/2025    SURGEON: Luis Fox MD    HOSPITAL: [facility]    PREOPERATIVE DIAGNOSES:   1. Carpal tunnel syndrome, left wrist.   2. Cubital tunnel syndrome, left elbow.  3. Left trigger thumb with ganglion cyst    POSTOPERATIVE DIAGNOSES: Same as above    PROCEDURES:   1. Carpal tunnel release, left wrist.  2. Cubital tunnel release, left elbow.   3. Left trigger thumb release with ganglion cyst excision    ANESTHESIA: Axillary block, IV sedation.    IV FLUIDS: As per Anesthesia.    ESTIMATED BLOOD LOSS: Less than 20 cc.    COUNTS: Correct.    COMPLICATIONS: None.    CONDITION: Stable to PACU.    INDICATIONS FOR PROCEDURE: Reynaldo Patton is a 65 y.o.  year old male  with continued pain and loss of motion of the left upper arm. Patient had the above findings. The risks, benefits and alternatives were discussed with the patient in detail. All questions were answered. Informed consent was obtained.    PROCEDURE IN DETAIL: The patient was found in preoperative holding by Anesthesia and found fit for surgery. The patient was taken to the operating room and placed on the operating table in supine position. All bony prominences were well padded. Timeout was called to identify correct patient, correct procedure, and correct site, and all were in agreement. The patient underwent IV sedation. The patient was then prepped and draped in normal sterile fashion leaving the affected upper extremity exposed for surgery. A well-padded tourniquet was placed on the left upper arm. Approximate tourniquet time was 24 minutes at 250. The patient received preoperative antibiotics. After exsanguination of the affected upper extremity, tourniquet was inflated. Attention was turned to the medial aspect of the elbow where a 5 cm incision was made just inferior to the medial epicondyle. Soft tissue dissection was carried down careful not to damage the neurovascular structures. The cubital tunnel was identified. Cubital  tunnel was then released. All binding structures were taken off the ulnar nerve. The ulnar nerve was flattened in the cubital tunnel. After complete release, the elbow was then flexed and extended and stressed. There was no subluxation or dislocation of the ulnar nerve. Patient did not require a transposition. After this was done, attention was turned to the left wrist where a 3 cm incision was made over the volar aspect of the wrist in line with the radial aspect of the 4th finger. Soft tissue dissection was carried down to the transverse carpal ligament where it was incised. All binding structures were taken off the median nerve. After complete release of the median nerve, the median nerve was also flattened in the carpal tunnel.  After this was done attention was turned to the volar aspect of the left thumb over the base where the soft tissue mass, redundant A1 pulley was appreciated.  A 1-1/2 cm incision was made soft tissue dissection was carefully taken down where a ganglion cyst with expected fluid was evacuated.  This was along the A1 pulley.  The A1 pulley was very scarred and redundant.  A 15 blade scalpel the cyst, and the A1 pulley was released.  After this was done the thumb was then flexed and extended, there was no additional triggering, there was no other masses appreciated.  Tourniquet was released, hemostasis achieved. Copious irrigation was used to wash the wound. The incisions were then closed with 3-0 Vicryl and 3-0 nylon suture in standard fashion. Xeroform, 4 x 4's, soft tissue dressing, Ace wrap and a sling were placed on the affected upper extremity. The patient was then awoken by Anesthesia and brought to PACU in stable condition.

## 2025-07-14 ENCOUNTER — OFFICE VISIT (OUTPATIENT)
Dept: ORTHOPEDICS | Facility: CLINIC | Age: 65
End: 2025-07-14
Payer: COMMERCIAL

## 2025-07-14 VITALS
DIASTOLIC BLOOD PRESSURE: 81 MMHG | BODY MASS INDEX: 32.33 KG/M2 | WEIGHT: 218.25 LBS | SYSTOLIC BLOOD PRESSURE: 156 MMHG | HEART RATE: 64 BPM | HEIGHT: 69 IN

## 2025-07-14 DIAGNOSIS — M65.312 TRIGGER FINGER OF LEFT THUMB: ICD-10-CM

## 2025-07-14 DIAGNOSIS — G56.22 CUBITAL TUNNEL SYNDROME, LEFT: ICD-10-CM

## 2025-07-14 DIAGNOSIS — G56.03 BILATERAL CARPAL TUNNEL SYNDROME: Primary | ICD-10-CM

## 2025-07-14 DIAGNOSIS — M67.40 GANGLION CYST: ICD-10-CM

## 2025-07-14 PROCEDURE — 1160F RVW MEDS BY RX/DR IN RCRD: CPT | Mod: CPTII,,,

## 2025-07-14 PROCEDURE — 3044F HG A1C LEVEL LT 7.0%: CPT | Mod: CPTII,,,

## 2025-07-14 PROCEDURE — 3077F SYST BP >= 140 MM HG: CPT | Mod: CPTII,,,

## 2025-07-14 PROCEDURE — 99024 POSTOP FOLLOW-UP VISIT: CPT | Mod: ,,,

## 2025-07-14 PROCEDURE — 4010F ACE/ARB THERAPY RXD/TAKEN: CPT | Mod: CPTII,,,

## 2025-07-14 PROCEDURE — 3079F DIAST BP 80-89 MM HG: CPT | Mod: CPTII,,,

## 2025-07-14 PROCEDURE — 1159F MED LIST DOCD IN RCRD: CPT | Mod: CPTII,,,

## 2025-07-14 NOTE — PROGRESS NOTES
Subjective:    CC: Post-op Evaluation of the Left Hand (PO L trigger thumb sx 7/1/25. Pt states thumb is doing ok. Has pain when its not being used. Has difficulty welling. Icing and elevating it. Has sensitivity. Still has numbness and tingling. Not much ROM. Hasn't been using it much due to being worried about damaging it. Taking tylenol PRN.), Post-op Evaluation of the Left Wrist, and Post-op Evaluation of the Left Elbow (PO L carpal/cubital tunnel sx 7/1/25. Pt states elbow is doing fine, just has itching and denies pain. Has pain in his wrist and throughout hand. Has numbness and tingling in fingers. Has swelling in hand. Not able to make a fist. Incision looks good. Taking tylenol PRN for pain. Wearing sling.)       History of Present Illness    HPI:  Patient presents for his first post-op evaluation following left trigger thumb release, ganglion cyst excision, and left carpal and cubital tunnel release performed on 07/01/2025. He reports ongoing stiffness and swelling in his hand. His entire thumb is numb, with tingling, numbness, and a burning sensation in his palm and the first three fingers of his left hand. His ring finger has numbness only at the tip. He has been using a sling intermittently for recovery and elevation, stating he uses it inconsistently. He has been attempting to elevate his hand as much as possible and occasionally using ice for symptom management. He initially used hydrocodone for pain management but has since transitioned to using Tylenol as needed, stating he has a high pain tolerance. His elbow motion is good, although it feels stiff. He denies any further triggering in his thumb or numbness in his pinky finger.    PREVIOUS TREATMENTS:  Patient has been using a sling intermittently for recovery. He has been elevating his hand as much as possible and using ice occasionally.          ROS: Refer to HPI for pertinent ROS. All other 12 point systems negative.    Past Medical History:    Diagnosis Date    Coronary artery disease     Crohn's disease     Diabetes mellitus     borderline    GERD (gastroesophageal reflux disease)     Hypercholesterolemia     Hypertension     Sleep apnea     cpap    Trigger finger         Past Surgical History:   Procedure Laterality Date    CARPAL TUNNEL RELEASE Left 7/1/2025    Procedure: RELEASE, CARPAL TUNNEL;  Surgeon: Luis Fox MD;  Location: [facility] OR;  Service: Orthopedics;  Laterality: Left;    CHOLECYSTECTOMY      COLECTOMY      COLONOSCOPY      CORONARY STENT PLACEMENT  09/2015    [facility]    LEFT FRONTOTEMPORAL CRANIOTOMY FOR EXCISION FOR MENINGIOMA  06/24/2014    [facility]    LEFT FRONTOTEMPORAL RE-EXPLORATION & REMOVAL OF SCREW/PLATE  10/25/2016    [facility]    TRIGGER FINGER RELEASE Left 7/1/2025    Procedure: RELEASE, TRIGGER FINGER - block, stretcher;  Surgeon: Luis Fox MD;  Location: [facility] OR;  Service: Orthopedics;  Laterality: Left;    ULNAR TUNNEL RELEASE Left 7/1/2025    Procedure: RELEASE, CUBITAL TUNNEL;  Surgeon: Luis Fox MD;  Location: [facility] OR;  Service: Orthopedics;  Laterality: Left;        Medications Ordered Prior to Encounter[1]     Review of patient's allergies indicates:   Allergen Reactions    Strawberry Anaphylaxis       Objective:    Vitals:    07/14/25 0943   BP: (!) 156/81   Pulse: 64        Physical Exam:  Left upper extremity compartments are soft and warm.  Skin is intact.  There are no signs or symptoms of DVT or infection.  His incisions have healed nicely.  Stitches removed in clinic today and Steri-Strips applied.  He is minimally tender to palpation about his carpal tunnel incision.  He does have some expected swelling and stiffness of the hand and fingers.  Full range of motion of the elbow.  No recurrent triggering with flexion and extension of the fingers.  Sensation intact to the small finger.  He remains to have some numbness and tingling about the 1st 2nd 3rd along with some mild distal numbness and  tingling of the ring finger.  Brisk capillary refill.    Images:  Previous Images Reviewed and discussed with patient.    Assessment:  1. Bilateral carpal tunnel syndrome    2. Cubital tunnel syndrome, left    3. Trigger finger of left thumb    4. Ganglion cyst       Plan:  Assessment & Plan    TRIGGER THUMB, LEFT THUMB:   Removed stitches from left trigger thumb release.   Be cautious with thumb movement to prevent opening the trigger thumb incision.   Avoid letting thumb fall inward or overusing it when gripping objects like fridge doors.        CARPAL TUNNEL SYNDROME, LEFT UPPER LIMB:   Removed stitches from left carpal tunnel release.  -we have discussed the severity of his carpal tunnel entrapment.  He understands this may take some more time and may not gain full sensory aspect.    LESION OF ULNAR NERVE, LEFT UPPER LIMB:   Removed stitches from left cubital tunnel release.    POST-OPERATIVE CARE:   Applied steri-strips.   Discontinue use of sling to prevent stiffness.   Shower using simple soap like Dove or Dial, pat incision dry.   Leave incision clean and open to air when possible.   Cover incision with band-aid or use glove when in potentially unsanitary environments.   Avoid putting lotions or creams directly on incision sites.   Focus on elevating and icing the hand.   Begin using the hand, being mindful during final healing phases.  Pamphlet of hand exercises given in clinic.   Avoid heavy lifting for another week to ensure proper incision closure.   Take Tylenol as needed for pain.    FOLLOW-UP:   Follow up in 4 weeks.   Cancel follow-up visit if doing well.          Follow up: Follow up in about 4 weeks (around 8/11/2025).          This note was generated with the assistance of ambient listening technology. Verbal consent was obtained by the patient and accompanying visitor(s) for the recording of patient appointment to facilitate this note. I attest to having reviewed and edited the generated note for  accuracy, though some syntax or spelling errors may persist. Please contact the author of this note for any clarification.            [1]   Current Outpatient Medications on File Prior to Visit   Medication Sig Dispense Refill    amLODIPine (NORVASC) 10 MG tablet Take 10 mg by mouth once daily.      carvediloL (COREG) 25 MG tablet Take 25 mg by mouth 2 (two) times daily.      clopidogreL (PLAVIX) 75 mg tablet Take 75 mg by mouth once.      ergocalciferol (ERGOCALCIFEROL) 50,000 unit Cap Take 50,000 Units by mouth every 7 days.      FARXIGA 10 mg tablet Take 10 mg by mouth once daily.      furosemide (LASIX) 20 MG tablet Take 20 mg by mouth once daily.      HYDROcodone-acetaminophen (NORCO) 7.5-325 mg per tablet Take 1 tablet by mouth every 6 (six) hours as needed.      inFLIXimab-dyyb (INFLECTRA) 100 mg injection every 8 weeks.      KLOR-CON 10 10 mEq TbSR Take 10 mEq by mouth once daily.      lisinopriL (PRINIVIL,ZESTRIL) 40 MG tablet Take 40 mg by mouth once daily.      pantoprazole (PROTONIX) 40 MG tablet Take 40 mg by mouth once daily.      ranolazine (RANEXA) 500 MG Tb12 Take 500 mg by mouth once daily.      rosuvastatin (CRESTOR) 20 MG tablet Take 20 mg by mouth once daily.      tadalafiL (CIALIS) 20 MG Tab Take 10-20 mg by mouth once daily.       Current Facility-Administered Medications on File Prior to Visit   Medication Dose Route Frequency Provider Last Rate Last Admin    hylan g-f 20 (SYNVISC ONE) 48 mg/6 mL injection 48 mg  48 mg Intra-articular 1 time in Clinic/HOD Reg Smith PA-C

## 2025-07-18 ENCOUNTER — HOSPITAL ENCOUNTER (EMERGENCY)
Facility: HOSPITAL | Age: 65
Discharge: HOME | End: 2025-07-18
Attending: EMERGENCY MEDICINE
Payer: COMMERCIAL

## 2025-07-18 ENCOUNTER — TELEPHONE (OUTPATIENT)
Dept: ORTHOPEDICS | Facility: CLINIC | Age: 65
End: 2025-07-18
Payer: COMMERCIAL

## 2025-07-18 VITALS
BODY MASS INDEX: 32.29 KG/M2 | HEIGHT: 69 IN | SYSTOLIC BLOOD PRESSURE: 168 MMHG | RESPIRATION RATE: 18 BRPM | TEMPERATURE: 98 F | OXYGEN SATURATION: 98 % | DIASTOLIC BLOOD PRESSURE: 83 MMHG | HEART RATE: 84 BPM | WEIGHT: 218 LBS

## 2025-07-18 DIAGNOSIS — Z51.89 VISIT FOR WOUND CHECK: Primary | ICD-10-CM

## 2025-07-18 PROCEDURE — 99282 EMERGENCY DEPT VISIT SF MDM: CPT

## 2025-07-18 RX ORDER — KETOROLAC TROMETHAMINE 30 MG/ML
60 INJECTION, SOLUTION INTRAMUSCULAR; INTRAVENOUS
Status: DISCONTINUED | OUTPATIENT
Start: 2025-07-18 | End: 2025-07-18

## 2025-07-18 NOTE — TELEPHONE ENCOUNTER
Copied from CRM #[id number]. Topic: General Inquiry - Patient Advice  >> Jul 18, 2025  9:55 AM Aime wrote:  .Who Called: Reynaldo Patton    Caller is requesting assistance/information from provider's office.    Symptoms (please be specific):  incision on thumb  How long has patient had these symptoms:    List of preferred pharmacies on file (remove unneeded): [unfilled]  If different, enter pharmacy into here including location and phone number:       Preferred Method of Contact: Phone Call  Patient's Preferred Phone Number on File: [phone]   Best Call Back Number, if different:  Additional Information: patient called requesting to speak with nurse

## 2025-07-18 NOTE — ED PROVIDER NOTES
"Encounter Date: 7/18/2025       History     Chief Complaint   Patient presents with    Post-op Problem     Carpal tunnel surgery 7-1, sutures removed 7-14; states that a small part opened up; no redness, swelling noted     Patient states that on July 1st he had a carpal tunnel surgery and trigger finger release done to his left hand.  Patient states that on July 14th his sutures were removed.  Patient states that 2 days ago he was moving his hand when a small portion of the surgical incision at the base of his left thumb appeared to "open up."Patient states that he contacted his surgeon's office and was instructed to have Steri-Strips applied to the wound.  Denies any redness, swelling, drainage, fever, or any other symptoms.  Past medical history of diabetes, Crohn's, hypertension.    The history is provided by the patient.     Review of patient's allergies indicates:   Allergen Reactions    Strawberry Anaphylaxis     Past Medical History:   Diagnosis Date    Coronary artery disease     Crohn's disease     Diabetes mellitus     borderline    GERD (gastroesophageal reflux disease)     Hypercholesterolemia     Hypertension     Sleep apnea     cpap    Trigger finger      Past Surgical History:   Procedure Laterality Date    CARPAL TUNNEL RELEASE Left 7/1/2025    Procedure: RELEASE, CARPAL TUNNEL;  Surgeon: Luis Fox MD;  Location: [facility] OR;  Service: Orthopedics;  Laterality: Left;    CHOLECYSTECTOMY      COLECTOMY      COLONOSCOPY      CORONARY STENT PLACEMENT  09/2015    [facility]    LEFT FRONTOTEMPORAL CRANIOTOMY FOR EXCISION FOR MENINGIOMA  06/24/2014    [facility]    LEFT FRONTOTEMPORAL RE-EXPLORATION & REMOVAL OF SCREW/PLATE  10/25/2016    [facility]    TRIGGER FINGER RELEASE Left 7/1/2025    Procedure: RELEASE, TRIGGER FINGER - block, stretcher;  Surgeon: Luis Fox MD;  Location: [facility] OR;  Service: Orthopedics;  Laterality: Left;    ULNAR TUNNEL RELEASE Left 7/1/2025    Procedure: RELEASE, CUBITAL TUNNEL;  " Surgeon: Luis Fox MD;  Location: [facility];  Service: Orthopedics;  Laterality: Left;     Family History   Problem Relation Name Age of Onset    Cataracts Mother      Diabetes Mother      Heart disease Father       Social History[1]  Review of Systems   Constitutional: Negative.    HENT: Negative.     Eyes: Negative.    Respiratory: Negative.     Cardiovascular: Negative.    Gastrointestinal: Negative.    Endocrine: Negative.    Genitourinary: Negative.    Musculoskeletal: Negative.    Skin:  Positive for wound (Surgical wound.).   Allergic/Immunologic: Negative.    Neurological: Negative.  Negative for weakness and numbness.   Hematological: Negative.    Psychiatric/Behavioral: Negative.     All other systems reviewed and are negative.      Physical Exam     Initial Vitals [07/18/25 1756]   BP Pulse Resp Temp SpO2   (!) 168/83 84 18 97.7 °F (36.5 °C) 98 %      MAP       --         Physical Exam    Nursing note and vitals reviewed.  Constitutional: He appears well-developed and well-nourished. No distress.   HENT:   Head: Normocephalic and atraumatic. Mouth/Throat: Oropharynx is clear and moist.   Eyes: Conjunctivae and EOM are normal. Pupils are equal, round, and reactive to light.   Neck: Neck supple.   Normal range of motion.  Cardiovascular:  Normal rate, regular rhythm, normal heart sounds and intact distal pulses.           Pulses:       Radial pulses are 2+ on the right side and 2+ on the left side.   Pulmonary/Chest: Breath sounds normal. No respiratory distress.   Musculoskeletal:         General: No edema. Normal range of motion.      Right wrist: Normal.      Left wrist: Normal.      Left hand: No swelling, deformity or tenderness. Normal range of motion.      Cervical back: Normal range of motion and neck supple.      Comments: There is an approximately 2 cm surgical incision to the base of the left thumb.  Wound appears to be well healing without any drainage, swelling, or redness.  There is a  "very small area to the lower aspect of the wound that does not appear to be completely well approximated.     Neurological: He is alert and oriented to person, place, and time. He has normal strength. GCS score is 15. GCS eye subscore is 4. GCS verbal subscore is 5. GCS motor subscore is 6.   Skin: Skin is warm and dry. No rash noted.   Psychiatric: He has a normal mood and affect. Thought content normal.         ED Course   Procedures  Labs Reviewed - No data to display       Imaging Results    None          Medications - No data to display  Medical Decision Making  Patient states that on July 1st he had a carpal tunnel surgery and trigger finger release done to his left hand.  Patient states that on July 14th his sutures were removed.  Patient states that 2 days ago he was moving his hand when a small portion of the surgical incision at the base of his left thumb appeared to "open up."Patient states that he contacted his surgeon's office and was instructed to have Steri-Strips applied to the wound.  Denies any redness, swelling, drainage, fever, or any other symptoms.  Past medical history of diabetes, Crohn's, hypertension.    The history is provided by the patient.       Amount and/or Complexity of Data Reviewed  Discussion of management or test interpretation with external provider(s): Differential diagnosis (including but not limited to):   Judging by the patient's chief complaint and pertinent history, the patient has the following possible differential diagnoses, including but not limited to the following.  Some of these are deemed to be lower likelihood and some more likely based on my physical exam and history combined with possible lab work and/or imaging studies.   Please see the pertinent studies, and refer to the HPI.  Some of these diagnoses will take further evaluation to fully rule out, perhaps as an outpatient and the patient was encouraged to follow up when discharged for more comprehensive " evaluation.  Wound check, Steri-Strips of application, wound infection  Patient's surgical wound to his left hand appears to be well healing and does not appear to have infection.  Steri-Strips was applied to the lower aspect of the wound.  Patient tolerated well patient was given wound care instructions.  Instructed patient to follow up with his surgeon.  ED return precautions were given.                                            Clinical Impression:  Final diagnoses:  [Z51.89] Visit for wound check (Primary)          ED Disposition Condition    Discharge Stable          ED Prescriptions    None       Follow-up Information       Follow up With Specialties Details Why Contact Info    Patsy Caicedo FNP Family Medicine In 3 days  [address]  [address]  [facility] [id number] [phone]      Luis Fox MD Orthopedic Surgery In 3 days  [address]  [address]  [facility] [id number] [phone]                     [1]   Social History  Tobacco Use    Smoking status: Never    Smokeless tobacco: Never   Substance Use Topics    Alcohol use: Not Currently    Drug use: Yes     Types: Hydrocodone        Gina Grimes FNP  07/18/25 7113

## 2025-07-18 NOTE — TELEPHONE ENCOUNTER
Patient is 18 day s/p L trigger finger release. He has noticed minimal bleeding in one spot. States it started 07/15/2025. He thinks he might have grabbed something too aggressively and possibly opened the incision. He applied neosporin on it. He just wants to make sure if looks fine.     How to proceed ?

## 2025-07-21 NOTE — TELEPHONE ENCOUNTER
Called patient and the recommendation were relayed. He stated that to be on the safe side he went ahead and went to the ER late Friday afternoon.

## (undated) DEVICE — SUT 3-0 ETHILON 18 FS-1

## (undated) DEVICE — PAD CAST SOF-ROL RAYON 4INX4YD

## (undated) DEVICE — KIT SURGICAL TURNOVER

## (undated) DEVICE — Device

## (undated) DEVICE — DRAPE HAND STERILE

## (undated) DEVICE — GLOVE PROTEXIS HYDROGEL SZ6.5

## (undated) DEVICE — SPONGE COTTON TRAY 4X4IN

## (undated) DEVICE — DRAPE STERI U-SHAPED 47X51IN

## (undated) DEVICE — ELECTRODE PATIENT RETURN DISP

## (undated) DEVICE — CUFF ATS 2 PORT SNGL BLDR 18IN

## (undated) DEVICE — APPLICATOR CHLORAPREP ORN 26ML

## (undated) DEVICE — SOL NACL IRR 1000ML BTL

## (undated) DEVICE — GLOVE PROTEXIS LTX MICRO 6.5

## (undated) DEVICE — SUT VICRYL PLUS 2-0 CT1 18

## (undated) DEVICE — BANDAGE VELCLOSE ELAS 3INX5YD

## (undated) DEVICE — GLOVE 6.5 PROTEXIS PI BLUE

## (undated) DEVICE — GOWN POLY REINF X-LONG 2XL

## (undated) DEVICE — GLOVE PROTEXIS LTX MICRO 8

## (undated) DEVICE — SUT VICRYL PLUS 3-0 SH 18IN

## (undated) DEVICE — DRESSING XEROFORM NONADH 1X8IN

## (undated) DEVICE — BANDAGE VELCLOSE ELAS 4INX5YD

## (undated) DEVICE — GLOVE 8.5 PROTEXIS PI BLUE